# Patient Record
Sex: MALE | Race: WHITE | Employment: OTHER | URBAN - METROPOLITAN AREA
[De-identification: names, ages, dates, MRNs, and addresses within clinical notes are randomized per-mention and may not be internally consistent; named-entity substitution may affect disease eponyms.]

---

## 2017-01-01 ENCOUNTER — OFFICE VISIT (OUTPATIENT)
Dept: CARDIOLOGY CLINIC | Facility: HOSPITAL | Age: 82
End: 2017-01-01
Attending: INTERNAL MEDICINE
Payer: MEDICARE

## 2017-01-01 ENCOUNTER — HOSPITAL ENCOUNTER (INPATIENT)
Facility: HOSPITAL | Age: 82
LOS: 11 days | Discharge: HOME HEALTH | DRG: 286 | End: 2017-01-01
Attending: EMERGENCY MEDICINE | Admitting: HOSPITALIST
Payer: MEDICARE

## 2017-01-01 ENCOUNTER — APPOINTMENT (OUTPATIENT)
Dept: ULTRASOUND IMAGING | Facility: HOSPITAL | Age: 82
DRG: 291 | End: 2017-01-01
Attending: INTERNAL MEDICINE
Payer: MEDICARE

## 2017-01-01 ENCOUNTER — APPOINTMENT (OUTPATIENT)
Dept: CT IMAGING | Facility: HOSPITAL | Age: 82
DRG: 377 | End: 2017-01-01
Attending: EMERGENCY MEDICINE
Payer: MEDICARE

## 2017-01-01 ENCOUNTER — HOSPITAL ENCOUNTER (INPATIENT)
Facility: HOSPITAL | Age: 82
LOS: 1 days | DRG: 177 | End: 2017-01-01
Attending: INTERNAL MEDICINE | Admitting: INTERNAL MEDICINE
Payer: OTHER MISCELLANEOUS

## 2017-01-01 ENCOUNTER — TELEPHONE (OUTPATIENT)
Dept: CARDIOLOGY UNIT | Facility: HOSPITAL | Age: 82
End: 2017-01-01

## 2017-01-01 ENCOUNTER — APPOINTMENT (OUTPATIENT)
Dept: GENERAL RADIOLOGY | Facility: HOSPITAL | Age: 82
DRG: 291 | End: 2017-01-01
Attending: CLINICAL NURSE SPECIALIST
Payer: MEDICARE

## 2017-01-01 ENCOUNTER — LAB REQUISITION (OUTPATIENT)
Dept: LAB | Facility: HOSPITAL | Age: 82
End: 2017-01-01
Payer: MEDICARE

## 2017-01-01 ENCOUNTER — APPOINTMENT (OUTPATIENT)
Dept: CV DIAGNOSTICS | Facility: HOSPITAL | Age: 82
DRG: 286 | End: 2017-01-01
Attending: HOSPITALIST
Payer: MEDICARE

## 2017-01-01 ENCOUNTER — APPOINTMENT (OUTPATIENT)
Dept: ULTRASOUND IMAGING | Facility: HOSPITAL | Age: 82
DRG: 286 | End: 2017-01-01
Attending: INTERNAL MEDICINE
Payer: MEDICARE

## 2017-01-01 ENCOUNTER — ANESTHESIA EVENT (OUTPATIENT)
Dept: ENDOSCOPY | Facility: HOSPITAL | Age: 82
End: 2017-01-01

## 2017-01-01 ENCOUNTER — APPOINTMENT (OUTPATIENT)
Dept: GENERAL RADIOLOGY | Facility: HOSPITAL | Age: 82
DRG: 291 | End: 2017-01-01
Attending: INTERNAL MEDICINE
Payer: MEDICARE

## 2017-01-01 ENCOUNTER — TELEPHONE (OUTPATIENT)
Dept: CARDIOLOGY CLINIC | Facility: HOSPITAL | Age: 82
End: 2017-01-01

## 2017-01-01 ENCOUNTER — TELEPHONE (OUTPATIENT)
Dept: GASTROENTEROLOGY | Facility: CLINIC | Age: 82
End: 2017-01-01

## 2017-01-01 ENCOUNTER — APPOINTMENT (OUTPATIENT)
Dept: GENERAL RADIOLOGY | Facility: HOSPITAL | Age: 82
DRG: 377 | End: 2017-01-01
Attending: EMERGENCY MEDICINE
Payer: MEDICARE

## 2017-01-01 ENCOUNTER — APPOINTMENT (OUTPATIENT)
Dept: GENERAL RADIOLOGY | Facility: HOSPITAL | Age: 82
DRG: 286 | End: 2017-01-01
Attending: EMERGENCY MEDICINE
Payer: MEDICARE

## 2017-01-01 ENCOUNTER — APPOINTMENT (OUTPATIENT)
Dept: ULTRASOUND IMAGING | Facility: HOSPITAL | Age: 82
DRG: 286 | End: 2017-01-01
Attending: HOSPITALIST
Payer: MEDICARE

## 2017-01-01 ENCOUNTER — OFFICE VISIT (OUTPATIENT)
Dept: CARDIOLOGY CLINIC | Facility: HOSPITAL | Age: 82
DRG: 291 | End: 2017-01-01
Attending: INTERNAL MEDICINE
Payer: MEDICARE

## 2017-01-01 ENCOUNTER — ANESTHESIA (OUTPATIENT)
Dept: ENDOSCOPY | Facility: HOSPITAL | Age: 82
End: 2017-01-01

## 2017-01-01 ENCOUNTER — APPOINTMENT (OUTPATIENT)
Dept: GENERAL RADIOLOGY | Facility: HOSPITAL | Age: 82
DRG: 291 | End: 2017-01-01
Attending: EMERGENCY MEDICINE
Payer: MEDICARE

## 2017-01-01 ENCOUNTER — HOSPITAL ENCOUNTER (INPATIENT)
Facility: HOSPITAL | Age: 82
LOS: 1 days | Discharge: HOSPICE | DRG: 177 | End: 2017-01-01
Attending: EMERGENCY MEDICINE | Admitting: INTERNAL MEDICINE
Payer: MEDICARE

## 2017-01-01 ENCOUNTER — SURGERY (OUTPATIENT)
Age: 82
End: 2017-01-01

## 2017-01-01 ENCOUNTER — HOSPITAL ENCOUNTER (INPATIENT)
Facility: HOSPITAL | Age: 82
LOS: 5 days | Discharge: HOME HEALTH | DRG: 377 | End: 2017-01-01
Attending: EMERGENCY MEDICINE | Admitting: HOSPITALIST
Payer: MEDICARE

## 2017-01-01 ENCOUNTER — APPOINTMENT (OUTPATIENT)
Dept: INTERVENTIONAL RADIOLOGY/VASCULAR | Facility: HOSPITAL | Age: 82
DRG: 286 | End: 2017-01-01
Attending: NURSE PRACTITIONER
Payer: MEDICARE

## 2017-01-01 ENCOUNTER — APPOINTMENT (OUTPATIENT)
Dept: GENERAL RADIOLOGY | Facility: HOSPITAL | Age: 82
DRG: 177 | End: 2017-01-01
Attending: EMERGENCY MEDICINE
Payer: MEDICARE

## 2017-01-01 ENCOUNTER — LAB REQUISITION (OUTPATIENT)
Dept: LAB | Facility: HOSPITAL | Age: 82
End: 2017-01-01
Attending: INTERNAL MEDICINE
Payer: MEDICARE

## 2017-01-01 ENCOUNTER — HOSPITAL ENCOUNTER (INPATIENT)
Facility: HOSPITAL | Age: 82
LOS: 10 days | Discharge: HOME HEALTH | DRG: 291 | End: 2017-01-01
Attending: EMERGENCY MEDICINE | Admitting: EMERGENCY MEDICINE
Payer: MEDICARE

## 2017-01-01 ENCOUNTER — HOSPITAL ENCOUNTER (OUTPATIENT)
Dept: GENERAL RADIOLOGY | Facility: HOSPITAL | Age: 82
Discharge: HOME | End: 2017-01-01
Attending: INTERNAL MEDICINE
Payer: MEDICARE

## 2017-01-01 ENCOUNTER — TELEPHONE (OUTPATIENT)
Dept: MEDSURG UNIT | Facility: HOSPITAL | Age: 82
End: 2017-01-01

## 2017-01-01 ENCOUNTER — OFFICE VISIT (OUTPATIENT)
Dept: CARDIOLOGY CLINIC | Facility: HOSPITAL | Age: 82
DRG: 377 | End: 2017-01-01
Attending: INTERNAL MEDICINE
Payer: MEDICARE

## 2017-01-01 VITALS
DIASTOLIC BLOOD PRESSURE: 45 MMHG | HEART RATE: 63 BPM | WEIGHT: 123.38 LBS | RESPIRATION RATE: 18 BRPM | SYSTOLIC BLOOD PRESSURE: 91 MMHG | TEMPERATURE: 98 F | HEIGHT: 66 IN | BODY MASS INDEX: 19.83 KG/M2 | OXYGEN SATURATION: 95 %

## 2017-01-01 VITALS
BODY MASS INDEX: 25 KG/M2 | OXYGEN SATURATION: 100 % | HEART RATE: 60 BPM | WEIGHT: 157.13 LBS | RESPIRATION RATE: 16 BRPM | SYSTOLIC BLOOD PRESSURE: 93 MMHG | DIASTOLIC BLOOD PRESSURE: 51 MMHG

## 2017-01-01 VITALS
SYSTOLIC BLOOD PRESSURE: 115 MMHG | DIASTOLIC BLOOD PRESSURE: 53 MMHG | WEIGHT: 136.19 LBS | HEART RATE: 61 BPM | BODY MASS INDEX: 22 KG/M2 | RESPIRATION RATE: 16 BRPM | OXYGEN SATURATION: 97 %

## 2017-01-01 VITALS
RESPIRATION RATE: 21 BRPM | SYSTOLIC BLOOD PRESSURE: 129 MMHG | HEART RATE: 51 BPM | TEMPERATURE: 98 F | DIASTOLIC BLOOD PRESSURE: 49 MMHG | OXYGEN SATURATION: 93 %

## 2017-01-01 VITALS
OXYGEN SATURATION: 98 % | BODY MASS INDEX: 22 KG/M2 | HEART RATE: 62 BPM | SYSTOLIC BLOOD PRESSURE: 83 MMHG | WEIGHT: 134 LBS | DIASTOLIC BLOOD PRESSURE: 51 MMHG

## 2017-01-01 VITALS
RESPIRATION RATE: 20 BRPM | WEIGHT: 127.19 LBS | SYSTOLIC BLOOD PRESSURE: 129 MMHG | TEMPERATURE: 99 F | BODY MASS INDEX: 20.44 KG/M2 | OXYGEN SATURATION: 94 % | HEART RATE: 60 BPM | HEIGHT: 66 IN | DIASTOLIC BLOOD PRESSURE: 41 MMHG

## 2017-01-01 VITALS
HEART RATE: 60 BPM | BODY MASS INDEX: 23 KG/M2 | RESPIRATION RATE: 16 BRPM | DIASTOLIC BLOOD PRESSURE: 58 MMHG | OXYGEN SATURATION: 97 % | SYSTOLIC BLOOD PRESSURE: 119 MMHG | WEIGHT: 140.88 LBS

## 2017-01-01 VITALS
HEIGHT: 65.98 IN | SYSTOLIC BLOOD PRESSURE: 89 MMHG | OXYGEN SATURATION: 98 % | WEIGHT: 148.31 LBS | DIASTOLIC BLOOD PRESSURE: 49 MMHG | TEMPERATURE: 98 F | HEART RATE: 61 BPM | RESPIRATION RATE: 18 BRPM | BODY MASS INDEX: 23.83 KG/M2

## 2017-01-01 VITALS
HEIGHT: 66 IN | SYSTOLIC BLOOD PRESSURE: 126 MMHG | HEART RATE: 60 BPM | BODY MASS INDEX: 22.5 KG/M2 | WEIGHT: 140 LBS | TEMPERATURE: 98 F | RESPIRATION RATE: 21 BRPM | OXYGEN SATURATION: 98 % | DIASTOLIC BLOOD PRESSURE: 28 MMHG

## 2017-01-01 VITALS
WEIGHT: 140.69 LBS | BODY MASS INDEX: 23 KG/M2 | HEART RATE: 62 BPM | RESPIRATION RATE: 18 BRPM | SYSTOLIC BLOOD PRESSURE: 90 MMHG | OXYGEN SATURATION: 98 % | DIASTOLIC BLOOD PRESSURE: 45 MMHG

## 2017-01-01 VITALS
BODY MASS INDEX: 21 KG/M2 | SYSTOLIC BLOOD PRESSURE: 122 MMHG | RESPIRATION RATE: 16 BRPM | OXYGEN SATURATION: 92 % | DIASTOLIC BLOOD PRESSURE: 61 MMHG | HEART RATE: 60 BPM | WEIGHT: 131 LBS

## 2017-01-01 VITALS
HEART RATE: 61 BPM | DIASTOLIC BLOOD PRESSURE: 47 MMHG | OXYGEN SATURATION: 97 % | BODY MASS INDEX: 25 KG/M2 | SYSTOLIC BLOOD PRESSURE: 92 MMHG | WEIGHT: 153 LBS

## 2017-01-01 VITALS
DIASTOLIC BLOOD PRESSURE: 37 MMHG | BODY MASS INDEX: 24 KG/M2 | RESPIRATION RATE: 18 BRPM | SYSTOLIC BLOOD PRESSURE: 118 MMHG | WEIGHT: 148 LBS | OXYGEN SATURATION: 93 % | HEART RATE: 67 BPM

## 2017-01-01 VITALS
HEART RATE: 62 BPM | SYSTOLIC BLOOD PRESSURE: 105 MMHG | OXYGEN SATURATION: 90 % | BODY MASS INDEX: 21 KG/M2 | DIASTOLIC BLOOD PRESSURE: 42 MMHG | WEIGHT: 132 LBS

## 2017-01-01 DIAGNOSIS — D50.0 CHRONIC BLOOD LOSS ANEMIA: Primary | ICD-10-CM

## 2017-01-01 DIAGNOSIS — R19.5 OCCULT BLOOD POSITIVE STOOL: ICD-10-CM

## 2017-01-01 DIAGNOSIS — I50.9 ACUTE ON CHRONIC CONGESTIVE HEART FAILURE, UNSPECIFIED CONGESTIVE HEART FAILURE TYPE: ICD-10-CM

## 2017-01-01 DIAGNOSIS — N18.9 ACUTE RENAL FAILURE SUPERIMPOSED ON CHRONIC KIDNEY DISEASE, UNSPECIFIED CKD STAGE, UNSPECIFIED ACUTE RENAL FAILURE TYPE (HCC): ICD-10-CM

## 2017-01-01 DIAGNOSIS — D64.9 ANEMIA: ICD-10-CM

## 2017-01-01 DIAGNOSIS — R18.8 OTHER ASCITES: ICD-10-CM

## 2017-01-01 DIAGNOSIS — D64.9 ANEMIA, UNSPECIFIED TYPE: Primary | ICD-10-CM

## 2017-01-01 DIAGNOSIS — I50.9 HEART FAILURE, UNSPECIFIED (HCC): Primary | ICD-10-CM

## 2017-01-01 DIAGNOSIS — D64.9 ANEMIA, UNSPECIFIED TYPE: ICD-10-CM

## 2017-01-01 DIAGNOSIS — E11.9 TYPE 2 DIABETES MELLITUS WITHOUT COMPLICATIONS (HCC): ICD-10-CM
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
## 2017-01-01 DIAGNOSIS — R53.1 WEAKNESS GENERALIZED: ICD-10-CM

## 2017-01-01 DIAGNOSIS — R19.5 HEME POSITIVE STOOL: ICD-10-CM

## 2017-01-01 DIAGNOSIS — N17.9 ACUTE RENAL FAILURE SUPERIMPOSED ON CHRONIC KIDNEY DISEASE, UNSPECIFIED CKD STAGE, UNSPECIFIED ACUTE RENAL FAILURE TYPE (HCC): ICD-10-CM

## 2017-01-01 DIAGNOSIS — I25.5 ISCHEMIC CARDIOMYOPATHY: ICD-10-CM

## 2017-01-01 DIAGNOSIS — I50.23 ACUTE ON CHRONIC SYSTOLIC CONGESTIVE HEART FAILURE (HCC): ICD-10-CM

## 2017-01-01 DIAGNOSIS — E86.0 DEHYDRATION: ICD-10-CM

## 2017-01-01 DIAGNOSIS — I50.9 HEART FAILURE (HCC): ICD-10-CM

## 2017-01-01 DIAGNOSIS — N28.9 ACUTE RENAL INSUFFICIENCY: ICD-10-CM

## 2017-01-01 DIAGNOSIS — E83.52 HYPERCALCEMIA: ICD-10-CM

## 2017-01-01 DIAGNOSIS — R60.1 ANASARCA: Primary | ICD-10-CM

## 2017-01-01 DIAGNOSIS — I25.10 ATHEROSCLEROTIC HEART DISEASE OF NATIVE CORONARY ARTERY WITHOUT ANGINA PECTORIS: ICD-10-CM

## 2017-01-01 DIAGNOSIS — I50.9 ACUTE ON CHRONIC CONGESTIVE HEART FAILURE, UNSPECIFIED CONGESTIVE HEART FAILURE TYPE: Primary | ICD-10-CM

## 2017-01-01 DIAGNOSIS — J18.9 PNEUMONIA OF BOTH LUNGS DUE TO INFECTIOUS ORGANISM, UNSPECIFIED PART OF LUNG: ICD-10-CM

## 2017-01-01 LAB
ALBUMIN SERPL BCP-MCNC: 2.2 G/DL (ref 3.5–4.8)
ALBUMIN SERPL BCP-MCNC: 2.5 G/DL (ref 3.5–4.8)
ALBUMIN SERPL BCP-MCNC: 2.6 G/DL (ref 3.5–4.8)
ALBUMIN/GLOB SERPL: 0.5 {RATIO} (ref 1–2)
ALBUMIN/GLOB SERPL: 0.5 {RATIO} (ref 1–2)
ALP SERPL-CCNC: 66 U/L (ref 32–100)
ALP SERPL-CCNC: 67 U/L (ref 32–100)
ALP SERPL-CCNC: 67 U/L (ref 32–100)
ALT SERPL-CCNC: 14 U/L (ref 17–63)
ALT SERPL-CCNC: 15 U/L (ref 17–63)
ALT SERPL-CCNC: 18 U/L (ref 17–63)
ANION GAP SERPL CALC-SCNC: 10 MMOL/L (ref 0–18)
ANION GAP SERPL CALC-SCNC: 3 MMOL/L (ref 0–18)
ANION GAP SERPL CALC-SCNC: 3 MMOL/L (ref 0–18)
ANION GAP SERPL CALC-SCNC: 4 MMOL/L (ref 0–18)
ANION GAP SERPL CALC-SCNC: 5 MMOL/L (ref 0–18)
ANION GAP SERPL CALC-SCNC: 5 MMOL/L (ref 0–18)
ANION GAP SERPL CALC-SCNC: 6 MMOL/L (ref 0–18)
ANION GAP SERPL CALC-SCNC: 7 MMOL/L (ref 0–18)
ANION GAP SERPL CALC-SCNC: 8 MMOL/L (ref 0–18)
ANION GAP SERPL CALC-SCNC: 8 MMOL/L (ref 0–18)
ANION GAP SERPL CALC-SCNC: 9 MMOL/L (ref 0–18)
ANION GAP SERPL CALC-SCNC: 9 MMOL/L (ref 0–18)
ANTIBODY SCREEN: NEGATIVE
ANTIBODY SCREEN: NEGATIVE
APTT PPP: 28.8 SECONDS (ref 23.2–35.3)
AST SERPL-CCNC: 35 U/L (ref 15–41)
AST SERPL-CCNC: 36 U/L (ref 15–41)
AST SERPL-CCNC: 37 U/L (ref 15–41)
BACTERIA UR QL AUTO: NEGATIVE /HPF
BASOPHILS # BLD: 0 K/UL (ref 0–0.2)
BASOPHILS # BLD: 0.1 K/UL (ref 0–0.2)
BASOPHILS NFR BLD: 1 %
BILIRUB DIRECT SERPL-MCNC: 0.1 MG/DL (ref 0–0.2)
BILIRUB SERPL-MCNC: 0.7 MG/DL (ref 0.3–1.2)
BILIRUB SERPL-MCNC: 0.8 MG/DL (ref 0.3–1.2)
BILIRUB SERPL-MCNC: 2.4 MG/DL (ref 0.3–1.2)
BILIRUB UR QL: NEGATIVE
BLOOD TYPE BARCODE: 5100
BLOOD TYPE BARCODE: 9500
BNP SERPL-MCNC: 1165 PG/ML (ref 0–100)
BNP SERPL-MCNC: 1941 PG/ML (ref 0–100)
BUN SERPL-MCNC: 19 MG/DL (ref 8–20)
BUN SERPL-MCNC: 21 MG/DL (ref 8–20)
BUN SERPL-MCNC: 22 MG/DL (ref 8–20)
BUN SERPL-MCNC: 23 MG/DL (ref 8–20)
BUN SERPL-MCNC: 24 MG/DL (ref 8–20)
BUN SERPL-MCNC: 26 MG/DL (ref 8–20)
BUN SERPL-MCNC: 27 MG/DL (ref 8–20)
BUN SERPL-MCNC: 28 MG/DL (ref 8–20)
BUN SERPL-MCNC: 28 MG/DL (ref 8–20)
BUN SERPL-MCNC: 29 MG/DL (ref 8–20)
BUN SERPL-MCNC: 30 MG/DL (ref 8–20)
BUN SERPL-MCNC: 33 MG/DL (ref 8–20)
BUN SERPL-MCNC: 34 MG/DL (ref 8–20)
BUN SERPL-MCNC: 36 MG/DL (ref 8–20)
BUN SERPL-MCNC: 36 MG/DL (ref 8–20)
BUN SERPL-MCNC: 45 MG/DL (ref 8–20)
BUN SERPL-MCNC: 49 MG/DL (ref 8–20)
BUN SERPL-MCNC: 50 MG/DL (ref 8–20)
BUN SERPL-MCNC: 52 MG/DL (ref 8–20)
BUN SERPL-MCNC: 57 MG/DL (ref 8–20)
BUN SERPL-MCNC: 60 MG/DL (ref 8–20)
BUN/CREAT SERPL: 12.4 (ref 10–20)
BUN/CREAT SERPL: 13.1 (ref 10–20)
BUN/CREAT SERPL: 16.3 (ref 10–20)
BUN/CREAT SERPL: 16.5 (ref 10–20)
BUN/CREAT SERPL: 16.7 (ref 10–20)
BUN/CREAT SERPL: 16.8 (ref 10–20)
BUN/CREAT SERPL: 17 (ref 10–20)
BUN/CREAT SERPL: 17.9 (ref 10–20)
BUN/CREAT SERPL: 18.4 (ref 10–20)
BUN/CREAT SERPL: 20.2 (ref 10–20)
BUN/CREAT SERPL: 20.4 (ref 10–20)
BUN/CREAT SERPL: 20.8 (ref 10–20)
BUN/CREAT SERPL: 21 (ref 10–20)
BUN/CREAT SERPL: 21.1 (ref 10–20)
BUN/CREAT SERPL: 23.1 (ref 10–20)
BUN/CREAT SERPL: 24.1 (ref 10–20)
BUN/CREAT SERPL: 25.4 (ref 10–20)
BUN/CREAT SERPL: 30.5 (ref 10–20)
BUN/CREAT SERPL: 31 (ref 10–20)
BUN/CREAT SERPL: 31.1 (ref 10–20)
BUN/CREAT SERPL: 32.1 (ref 10–20)
BUN/CREAT SERPL: 32.7 (ref 10–20)
BUN/CREAT SERPL: 34.1 (ref 10–20)
CALCIUM SERPL-MCNC: 8 MG/DL (ref 8.5–10.5)
CALCIUM SERPL-MCNC: 8.1 MG/DL (ref 8.5–10.5)
CALCIUM SERPL-MCNC: 8.2 MG/DL (ref 8.5–10.5)
CALCIUM SERPL-MCNC: 8.4 MG/DL (ref 8.5–10.5)
CALCIUM SERPL-MCNC: 8.5 MG/DL (ref 8.5–10.5)
CALCIUM SERPL-MCNC: 8.5 MG/DL (ref 8.5–10.5)
CALCIUM SERPL-MCNC: 8.6 MG/DL (ref 8.5–10.5)
CALCIUM SERPL-MCNC: 8.7 MG/DL (ref 8.5–10.5)
CALCIUM SERPL-MCNC: 8.8 MG/DL (ref 8.5–10.5)
CALCIUM SERPL-MCNC: 8.8 MG/DL (ref 8.5–10.5)
CALCIUM SERPL-MCNC: 8.9 MG/DL (ref 8.5–10.5)
CALCIUM SERPL-MCNC: 8.9 MG/DL (ref 8.5–10.5)
CALCIUM SERPL-MCNC: 9 MG/DL (ref 8.5–10.5)
CALCIUM SERPL-MCNC: 9.4 MG/DL (ref 8.5–10.5)
CALCIUM SERPL-MCNC: 9.4 MG/DL (ref 8.5–10.5)
CHLORIDE SERPL-SCNC: 100 MMOL/L (ref 95–110)
CHLORIDE SERPL-SCNC: 101 MMOL/L (ref 95–110)
CHLORIDE SERPL-SCNC: 102 MMOL/L (ref 95–110)
CHLORIDE SERPL-SCNC: 84 MMOL/L (ref 95–110)
CHLORIDE SERPL-SCNC: 84 MMOL/L (ref 95–110)
CHLORIDE SERPL-SCNC: 85 MMOL/L (ref 95–110)
CHLORIDE SERPL-SCNC: 85 MMOL/L (ref 95–110)
CHLORIDE SERPL-SCNC: 88 MMOL/L (ref 95–110)
CHLORIDE SERPL-SCNC: 89 MMOL/L (ref 95–110)
CHLORIDE SERPL-SCNC: 91 MMOL/L (ref 95–110)
CHLORIDE SERPL-SCNC: 91 MMOL/L (ref 95–110)
CHLORIDE SERPL-SCNC: 92 MMOL/L (ref 95–110)
CHLORIDE SERPL-SCNC: 93 MMOL/L (ref 95–110)
CHLORIDE SERPL-SCNC: 97 MMOL/L (ref 95–110)
CHLORIDE SERPL-SCNC: 98 MMOL/L (ref 95–110)
CHLORIDE SERPL-SCNC: 99 MMOL/L (ref 95–110)
CHLORIDE SERPL-SCNC: 99 MMOL/L (ref 95–110)
CHOLEST SERPL-MCNC: 124 MG/DL (ref 110–200)
CLARITY UR: CLEAR
CO2 SERPL-SCNC: 30 MMOL/L (ref 22–32)
CO2 SERPL-SCNC: 31 MMOL/L (ref 22–32)
CO2 SERPL-SCNC: 32 MMOL/L (ref 22–32)
CO2 SERPL-SCNC: 32 MMOL/L (ref 22–32)
CO2 SERPL-SCNC: 33 MMOL/L (ref 22–32)
CO2 SERPL-SCNC: 34 MMOL/L (ref 22–32)
CO2 SERPL-SCNC: 36 MMOL/L (ref 22–32)
CO2 SERPL-SCNC: 37 MMOL/L (ref 22–32)
CO2 SERPL-SCNC: 37 MMOL/L (ref 22–32)
CO2 SERPL-SCNC: 38 MMOL/L (ref 22–32)
CO2 SERPL-SCNC: 38 MMOL/L (ref 22–32)
CO2 SERPL-SCNC: 39 MMOL/L (ref 22–32)
CO2 SERPL-SCNC: 39 MMOL/L (ref 22–32)
COLOR UR: YELLOW
CREAT SERPL-MCNC: 1.16 MG/DL (ref 0.5–1.5)
CREAT SERPL-MCNC: 1.23 MG/DL (ref 0.5–1.5)
CREAT SERPL-MCNC: 1.32 MG/DL (ref 0.5–1.5)
CREAT SERPL-MCNC: 1.33 MG/DL (ref 0.5–1.5)
CREAT SERPL-MCNC: 1.37 MG/DL (ref 0.5–1.5)
CREAT SERPL-MCNC: 1.37 MG/DL (ref 0.5–1.5)
CREAT SERPL-MCNC: 1.38 MG/DL (ref 0.5–1.5)
CREAT SERPL-MCNC: 1.41 MG/DL (ref 0.5–1.5)
CREAT SERPL-MCNC: 1.41 MG/DL (ref 0.5–1.5)
CREAT SERPL-MCNC: 1.43 MG/DL (ref 0.5–1.5)
CREAT SERPL-MCNC: 1.44 MG/DL (ref 0.5–1.5)
CREAT SERPL-MCNC: 1.47 MG/DL (ref 0.5–1.5)
CREAT SERPL-MCNC: 1.51 MG/DL (ref 0.5–1.5)
CREAT SERPL-MCNC: 1.53 MG/DL (ref 0.5–1.5)
CREAT SERPL-MCNC: 1.53 MG/DL (ref 0.5–1.5)
CREAT SERPL-MCNC: 1.56 MG/DL (ref 0.5–1.5)
CREAT SERPL-MCNC: 1.6 MG/DL (ref 0.5–1.5)
CREAT SERPL-MCNC: 1.62 MG/DL (ref 0.5–1.5)
CREAT SERPL-MCNC: 1.65 MG/DL (ref 0.5–1.5)
CREAT SERPL-MCNC: 1.78 MG/DL (ref 0.5–1.5)
CREAT SERPL-MCNC: 1.83 MG/DL (ref 0.5–1.5)
CREAT SERPL-MCNC: 1.93 MG/DL (ref 0.5–1.5)
CREAT SERPL-MCNC: 1.97 MG/DL (ref 0.5–1.5)
EOSINOPHIL # BLD: 0 K/UL (ref 0–0.7)
EOSINOPHIL # BLD: 0.1 K/UL (ref 0–0.7)
EOSINOPHIL # BLD: 0.2 K/UL (ref 0–0.7)
EOSINOPHIL NFR BLD: 0 %
EOSINOPHIL NFR BLD: 1 %
EOSINOPHIL NFR BLD: 2 %
EOSINOPHIL NFR BLD: 3 %
ERYTHROCYTE [DISTWIDTH] IN BLOOD BY AUTOMATED COUNT: 16.9 % (ref 11–15)
ERYTHROCYTE [DISTWIDTH] IN BLOOD BY AUTOMATED COUNT: 18.2 % (ref 11–15)
ERYTHROCYTE [DISTWIDTH] IN BLOOD BY AUTOMATED COUNT: 18.4 % (ref 11–15)
ERYTHROCYTE [DISTWIDTH] IN BLOOD BY AUTOMATED COUNT: 19.1 % (ref 11–15)
ERYTHROCYTE [DISTWIDTH] IN BLOOD BY AUTOMATED COUNT: 19.1 % (ref 11–15)
ERYTHROCYTE [DISTWIDTH] IN BLOOD BY AUTOMATED COUNT: 19.4 % (ref 11–15)
ERYTHROCYTE [DISTWIDTH] IN BLOOD BY AUTOMATED COUNT: 19.5 % (ref 11–15)
ERYTHROCYTE [DISTWIDTH] IN BLOOD BY AUTOMATED COUNT: 19.6 % (ref 11–15)
ERYTHROCYTE [DISTWIDTH] IN BLOOD BY AUTOMATED COUNT: 19.7 % (ref 11–15)
ERYTHROCYTE [DISTWIDTH] IN BLOOD BY AUTOMATED COUNT: 19.7 % (ref 11–15)
ERYTHROCYTE [DISTWIDTH] IN BLOOD BY AUTOMATED COUNT: 19.8 % (ref 11–15)
ERYTHROCYTE [DISTWIDTH] IN BLOOD BY AUTOMATED COUNT: 20.2 % (ref 11–15)
ERYTHROCYTE [DISTWIDTH] IN BLOOD BY AUTOMATED COUNT: 20.4 % (ref 11–15)
ERYTHROCYTE [DISTWIDTH] IN BLOOD BY AUTOMATED COUNT: 20.5 % (ref 11–15)
ERYTHROCYTE [DISTWIDTH] IN BLOOD BY AUTOMATED COUNT: 21.6 % (ref 11–15)
ERYTHROCYTE [DISTWIDTH] IN BLOOD BY AUTOMATED COUNT: 22.3 % (ref 11–15)
FERRITIN SERPL IA-MCNC: 95 NG/ML (ref 24–336)
GLOBULIN PLAS-MCNC: 4.5 G/DL (ref 2.5–3.7)
GLOBULIN PLAS-MCNC: 5.3 G/DL (ref 2.5–3.7)
GLUCOSE BLDC GLUCOMTR-MCNC: 101 MG/DL (ref 70–99)
GLUCOSE BLDC GLUCOMTR-MCNC: 101 MG/DL (ref 70–99)
GLUCOSE BLDC GLUCOMTR-MCNC: 102 MG/DL (ref 70–99)
GLUCOSE BLDC GLUCOMTR-MCNC: 104 MG/DL (ref 70–99)
GLUCOSE BLDC GLUCOMTR-MCNC: 106 MG/DL (ref 70–99)
GLUCOSE BLDC GLUCOMTR-MCNC: 107 MG/DL (ref 70–99)
GLUCOSE BLDC GLUCOMTR-MCNC: 107 MG/DL (ref 70–99)
GLUCOSE BLDC GLUCOMTR-MCNC: 108 MG/DL (ref 70–99)
GLUCOSE BLDC GLUCOMTR-MCNC: 117 MG/DL (ref 70–99)
GLUCOSE BLDC GLUCOMTR-MCNC: 118 MG/DL (ref 70–99)
GLUCOSE BLDC GLUCOMTR-MCNC: 118 MG/DL (ref 70–99)
GLUCOSE BLDC GLUCOMTR-MCNC: 121 MG/DL (ref 70–99)
GLUCOSE BLDC GLUCOMTR-MCNC: 121 MG/DL (ref 70–99)
GLUCOSE BLDC GLUCOMTR-MCNC: 122 MG/DL (ref 70–99)
GLUCOSE BLDC GLUCOMTR-MCNC: 122 MG/DL (ref 70–99)
GLUCOSE BLDC GLUCOMTR-MCNC: 123 MG/DL (ref 70–99)
GLUCOSE BLDC GLUCOMTR-MCNC: 124 MG/DL (ref 70–99)
GLUCOSE BLDC GLUCOMTR-MCNC: 124 MG/DL (ref 70–99)
GLUCOSE BLDC GLUCOMTR-MCNC: 126 MG/DL (ref 70–99)
GLUCOSE BLDC GLUCOMTR-MCNC: 127 MG/DL (ref 70–99)
GLUCOSE BLDC GLUCOMTR-MCNC: 130 MG/DL (ref 70–99)
GLUCOSE BLDC GLUCOMTR-MCNC: 131 MG/DL (ref 70–99)
GLUCOSE BLDC GLUCOMTR-MCNC: 131 MG/DL (ref 70–99)
GLUCOSE BLDC GLUCOMTR-MCNC: 138 MG/DL (ref 70–99)
GLUCOSE BLDC GLUCOMTR-MCNC: 141 MG/DL (ref 70–99)
GLUCOSE BLDC GLUCOMTR-MCNC: 149 MG/DL (ref 70–99)
GLUCOSE BLDC GLUCOMTR-MCNC: 154 MG/DL (ref 70–99)
GLUCOSE BLDC GLUCOMTR-MCNC: 159 MG/DL (ref 70–99)
GLUCOSE BLDC GLUCOMTR-MCNC: 171 MG/DL (ref 70–99)
GLUCOSE BLDC GLUCOMTR-MCNC: 173 MG/DL (ref 70–99)
GLUCOSE BLDC GLUCOMTR-MCNC: 174 MG/DL (ref 70–99)
GLUCOSE BLDC GLUCOMTR-MCNC: 198 MG/DL (ref 70–99)
GLUCOSE BLDC GLUCOMTR-MCNC: 211 MG/DL (ref 70–99)
GLUCOSE BLDC GLUCOMTR-MCNC: 76 MG/DL (ref 70–99)
GLUCOSE BLDC GLUCOMTR-MCNC: 83 MG/DL (ref 70–99)
GLUCOSE BLDC GLUCOMTR-MCNC: 85 MG/DL (ref 70–99)
GLUCOSE BLDC GLUCOMTR-MCNC: 87 MG/DL (ref 70–99)
GLUCOSE BLDC GLUCOMTR-MCNC: 87 MG/DL (ref 70–99)
GLUCOSE BLDC GLUCOMTR-MCNC: 93 MG/DL (ref 70–99)
GLUCOSE BLDC GLUCOMTR-MCNC: 94 MG/DL (ref 70–99)
GLUCOSE BLDC GLUCOMTR-MCNC: 95 MG/DL (ref 70–99)
GLUCOSE BLDC GLUCOMTR-MCNC: 96 MG/DL (ref 70–99)
GLUCOSE BLDC GLUCOMTR-MCNC: 96 MG/DL (ref 70–99)
GLUCOSE BLDC GLUCOMTR-MCNC: 97 MG/DL (ref 70–99)
GLUCOSE BLDC GLUCOMTR-MCNC: 98 MG/DL (ref 70–99)
GLUCOSE BLDC GLUCOMTR-MCNC: 99 MG/DL (ref 70–99)
GLUCOSE SERPL-MCNC: 101 MG/DL (ref 70–99)
GLUCOSE SERPL-MCNC: 102 MG/DL (ref 70–99)
GLUCOSE SERPL-MCNC: 103 MG/DL (ref 70–99)
GLUCOSE SERPL-MCNC: 106 MG/DL (ref 70–99)
GLUCOSE SERPL-MCNC: 106 MG/DL (ref 70–99)
GLUCOSE SERPL-MCNC: 110 MG/DL (ref 70–99)
GLUCOSE SERPL-MCNC: 114 MG/DL (ref 70–99)
GLUCOSE SERPL-MCNC: 130 MG/DL (ref 70–99)
GLUCOSE SERPL-MCNC: 132 MG/DL (ref 70–99)
GLUCOSE SERPL-MCNC: 138 MG/DL (ref 70–99)
GLUCOSE SERPL-MCNC: 193 MG/DL (ref 70–99)
GLUCOSE SERPL-MCNC: 196 MG/DL (ref 70–99)
GLUCOSE SERPL-MCNC: 83 MG/DL (ref 70–99)
GLUCOSE SERPL-MCNC: 88 MG/DL (ref 70–99)
GLUCOSE SERPL-MCNC: 89 MG/DL (ref 70–99)
GLUCOSE SERPL-MCNC: 90 MG/DL (ref 70–99)
GLUCOSE SERPL-MCNC: 90 MG/DL (ref 70–99)
GLUCOSE SERPL-MCNC: 91 MG/DL (ref 70–99)
GLUCOSE SERPL-MCNC: 96 MG/DL (ref 70–99)
GLUCOSE SERPL-MCNC: 96 MG/DL (ref 70–99)
GLUCOSE SERPL-MCNC: 97 MG/DL (ref 70–99)
GLUCOSE SERPL-MCNC: 97 MG/DL (ref 70–99)
GLUCOSE SERPL-MCNC: 98 MG/DL (ref 70–99)
GLUCOSE UR-MCNC: NEGATIVE MG/DL
HAV AB SER QL IA: REACTIVE
HAV IGM SERPL QL IA: NONREACTIVE
HBA1C MFR BLD: 5.3 % (ref 4–6)
HBV CORE AB SERPL QL IA: NONREACTIVE
HBV SURFACE AB SER-ACNC: 18.33 MIU/ML (ref ?–10)
HBV SURFACE AG SERPL QL IA: NONREACTIVE
HBV SURFACE AG SERPL QL IA: REACTIVE
HCT VFR BLD AUTO: 17.6 % (ref 41–52)
HCT VFR BLD AUTO: 18.8 % (ref 41–52)
HCT VFR BLD AUTO: 22.7 % (ref 41–52)
HCT VFR BLD AUTO: 22.7 % (ref 41–52)
HCT VFR BLD AUTO: 22.9 % (ref 41–52)
HCT VFR BLD AUTO: 23 % (ref 41–52)
HCT VFR BLD AUTO: 23.2 % (ref 41–52)
HCT VFR BLD AUTO: 23.8 % (ref 41–52)
HCT VFR BLD AUTO: 28.6 % (ref 41–52)
HCT VFR BLD AUTO: 28.6 % (ref 41–52)
HCT VFR BLD AUTO: 29.8 % (ref 41–52)
HCT VFR BLD AUTO: 30.6 % (ref 41–52)
HCT VFR BLD AUTO: 30.7 % (ref 41–52)
HCT VFR BLD AUTO: 31.1 % (ref 41–52)
HCT VFR BLD AUTO: 32 % (ref 41–52)
HCT VFR BLD AUTO: 32.2 % (ref 41–52)
HCT VFR BLD AUTO: 32.6 % (ref 41–52)
HCT VFR BLD AUTO: 33 % (ref 41–52)
HCV AB SERPL QL IA: NONREACTIVE
HDLC SERPL-MCNC: 29 MG/DL
HGB BLD-MCNC: 10 G/DL (ref 13.5–17.5)
HGB BLD-MCNC: 10.1 G/DL (ref 13.5–17.5)
HGB BLD-MCNC: 10.3 G/DL (ref 13.5–17.5)
HGB BLD-MCNC: 10.7 G/DL (ref 13.5–17.5)
HGB BLD-MCNC: 10.8 G/DL (ref 13.5–17.5)
HGB BLD-MCNC: 11 G/DL (ref 13.5–17.5)
HGB BLD-MCNC: 11.1 G/DL (ref 13.5–17.5)
HGB BLD-MCNC: 5.7 G/DL (ref 13.5–17.5)
HGB BLD-MCNC: 6.1 G/DL (ref 13.5–17.5)
HGB BLD-MCNC: 7.4 G/DL (ref 13.5–17.5)
HGB BLD-MCNC: 7.4 G/DL (ref 13.5–17.5)
HGB BLD-MCNC: 7.5 G/DL (ref 13.5–17.5)
HGB BLD-MCNC: 7.7 G/DL (ref 13.5–17.5)
HGB BLD-MCNC: 7.9 G/DL (ref 13.5–17.5)
HGB BLD-MCNC: 7.9 G/DL (ref 13.5–17.5)
HGB BLD-MCNC: 9.5 G/DL (ref 13.5–17.5)
HGB BLD-MCNC: 9.6 G/DL (ref 13.5–17.5)
HGB BLD-MCNC: 9.9 G/DL (ref 13.5–17.5)
HGB UR QL STRIP.AUTO: NEGATIVE
HYALINE CASTS #/AREA URNS AUTO: 4 /LPF
INR BLD: 1.3 (ref 0.9–1.2)
INR BLD: 1.5 (ref 0.9–1.2)
IRON SATN MFR SERPL: 18 % (ref 20–55)
IRON SERPL-MCNC: 42 MCG/DL (ref 45–182)
KETONES UR-MCNC: NEGATIVE MG/DL
LDLC SERPL CALC-MCNC: 83 MG/DL (ref 0–99)
LIPASE SERPL-CCNC: 29 U/L (ref 22–51)
LYMPHOCYTES # BLD: 0.5 K/UL (ref 1–4)
LYMPHOCYTES # BLD: 0.6 K/UL (ref 1–4)
LYMPHOCYTES # BLD: 0.7 K/UL (ref 1–4)
LYMPHOCYTES # BLD: 0.8 K/UL (ref 1–4)
LYMPHOCYTES # BLD: 0.8 K/UL (ref 1–4)
LYMPHOCYTES # BLD: 0.9 K/UL (ref 1–4)
LYMPHOCYTES # BLD: 0.9 K/UL (ref 1–4)
LYMPHOCYTES # BLD: 1 K/UL (ref 1–4)
LYMPHOCYTES # BLD: 1.1 K/UL (ref 1–4)
LYMPHOCYTES # BLD: 1.2 K/UL (ref 1–4)
LYMPHOCYTES # BLD: 1.2 K/UL (ref 1–4)
LYMPHOCYTES # BLD: 1.3 K/UL (ref 1–4)
LYMPHOCYTES NFR BLD: 10 %
LYMPHOCYTES NFR BLD: 11 %
LYMPHOCYTES NFR BLD: 12 %
LYMPHOCYTES NFR BLD: 13 %
LYMPHOCYTES NFR BLD: 14 %
LYMPHOCYTES NFR BLD: 14 %
LYMPHOCYTES NFR BLD: 15 %
LYMPHOCYTES NFR BLD: 15 %
LYMPHOCYTES NFR BLD: 16 %
LYMPHOCYTES NFR BLD: 5 %
MAGNESIUM SERPL-MCNC: 1.8 MG/DL (ref 1.8–2.5)
MAGNESIUM SERPL-MCNC: 1.9 MG/DL (ref 1.8–2.5)
MAGNESIUM SERPL-MCNC: 2 MG/DL (ref 1.8–2.5)
MAGNESIUM SERPL-MCNC: 2 MG/DL (ref 1.8–2.5)
MAGNESIUM SERPL-MCNC: 2.1 MG/DL (ref 1.8–2.5)
MAGNESIUM SERPL-MCNC: 2.1 MG/DL (ref 1.8–2.5)
MAGNESIUM SERPL-MCNC: 2.3 MG/DL (ref 1.8–2.5)
MAGNESIUM SERPL-MCNC: 2.3 MG/DL (ref 1.8–2.5)
MAGNESIUM SERPL-MCNC: 2.4 MG/DL (ref 1.8–2.5)
MAGNESIUM SERPL-MCNC: 2.5 MG/DL (ref 1.8–2.5)
MCH RBC QN AUTO: 33.6 PG (ref 27–32)
MCH RBC QN AUTO: 33.9 PG (ref 27–32)
MCH RBC QN AUTO: 34 PG (ref 27–32)
MCH RBC QN AUTO: 34.1 PG (ref 27–32)
MCH RBC QN AUTO: 34.3 PG (ref 27–32)
MCH RBC QN AUTO: 34.4 PG (ref 27–32)
MCH RBC QN AUTO: 34.5 PG (ref 27–32)
MCH RBC QN AUTO: 34.5 PG (ref 27–32)
MCH RBC QN AUTO: 34.7 PG (ref 27–32)
MCH RBC QN AUTO: 35.2 PG (ref 27–32)
MCH RBC QN AUTO: 35.3 PG (ref 27–32)
MCH RBC QN AUTO: 35.5 PG (ref 27–32)
MCHC RBC AUTO-ENTMCNC: 32.1 G/DL (ref 32–37)
MCHC RBC AUTO-ENTMCNC: 32.4 G/DL (ref 32–37)
MCHC RBC AUTO-ENTMCNC: 32.5 G/DL (ref 32–37)
MCHC RBC AUTO-ENTMCNC: 32.6 G/DL (ref 32–37)
MCHC RBC AUTO-ENTMCNC: 32.7 G/DL (ref 32–37)
MCHC RBC AUTO-ENTMCNC: 32.9 G/DL (ref 32–37)
MCHC RBC AUTO-ENTMCNC: 33 G/DL (ref 32–37)
MCHC RBC AUTO-ENTMCNC: 33 G/DL (ref 32–37)
MCHC RBC AUTO-ENTMCNC: 33.1 G/DL (ref 32–37)
MCHC RBC AUTO-ENTMCNC: 33.2 G/DL (ref 32–37)
MCHC RBC AUTO-ENTMCNC: 33.4 G/DL (ref 32–37)
MCHC RBC AUTO-ENTMCNC: 33.4 G/DL (ref 32–37)
MCHC RBC AUTO-ENTMCNC: 33.6 G/DL (ref 32–37)
MCHC RBC AUTO-ENTMCNC: 33.7 G/DL (ref 32–37)
MCHC RBC AUTO-ENTMCNC: 33.7 G/DL (ref 32–37)
MCHC RBC AUTO-ENTMCNC: 34.3 G/DL (ref 32–37)
MCV RBC AUTO: 100.5 FL (ref 80–100)
MCV RBC AUTO: 101.7 FL (ref 80–100)
MCV RBC AUTO: 101.9 FL (ref 80–100)
MCV RBC AUTO: 102 FL (ref 80–100)
MCV RBC AUTO: 102.1 FL (ref 80–100)
MCV RBC AUTO: 102.2 FL (ref 80–100)
MCV RBC AUTO: 102.4 FL (ref 80–100)
MCV RBC AUTO: 102.8 FL (ref 80–100)
MCV RBC AUTO: 103.1 FL (ref 80–100)
MCV RBC AUTO: 103.4 FL (ref 80–100)
MCV RBC AUTO: 103.9 FL (ref 80–100)
MCV RBC AUTO: 104 FL (ref 80–100)
MCV RBC AUTO: 104.4 FL (ref 80–100)
MCV RBC AUTO: 108.6 FL (ref 80–100)
MCV RBC AUTO: 108.6 FL (ref 80–100)
MCV RBC AUTO: 110.7 FL (ref 80–100)
MONOCYTES # BLD: 0.7 K/UL (ref 0–1)
MONOCYTES # BLD: 0.9 K/UL (ref 0–1)
MONOCYTES # BLD: 0.9 K/UL (ref 0–1)
MONOCYTES # BLD: 1 K/UL (ref 0–1)
MONOCYTES # BLD: 1.1 K/UL (ref 0–1)
MONOCYTES # BLD: 1.2 K/UL (ref 0–1)
MONOCYTES # BLD: 1.3 K/UL (ref 0–1)
MONOCYTES # BLD: 1.4 K/UL (ref 0–1)
MONOCYTES # BLD: 1.4 K/UL (ref 0–1)
MONOCYTES # BLD: 1.5 K/UL (ref 0–1)
MONOCYTES NFR BLD: 11 %
MONOCYTES NFR BLD: 12 %
MONOCYTES NFR BLD: 12 %
MONOCYTES NFR BLD: 13 %
MONOCYTES NFR BLD: 13 %
MONOCYTES NFR BLD: 14 %
MONOCYTES NFR BLD: 15 %
MONOCYTES NFR BLD: 16 %
MONOCYTES NFR BLD: 19 %
NEUTROPHILS # BLD AUTO: 3.9 K/UL (ref 1.8–7.7)
NEUTROPHILS # BLD AUTO: 4.2 K/UL (ref 1.8–7.7)
NEUTROPHILS # BLD AUTO: 4.3 K/UL (ref 1.8–7.7)
NEUTROPHILS # BLD AUTO: 4.4 K/UL (ref 1.8–7.7)
NEUTROPHILS # BLD AUTO: 4.4 K/UL (ref 1.8–7.7)
NEUTROPHILS # BLD AUTO: 4.6 K/UL (ref 1.8–7.7)
NEUTROPHILS # BLD AUTO: 4.8 K/UL (ref 1.8–7.7)
NEUTROPHILS # BLD AUTO: 4.8 K/UL (ref 1.8–7.7)
NEUTROPHILS # BLD AUTO: 5.1 K/UL (ref 1.8–7.7)
NEUTROPHILS # BLD AUTO: 5.6 K/UL (ref 1.8–7.7)
NEUTROPHILS # BLD AUTO: 5.8 K/UL (ref 1.8–7.7)
NEUTROPHILS # BLD AUTO: 5.9 K/UL (ref 1.8–7.7)
NEUTROPHILS # BLD AUTO: 6.2 K/UL (ref 1.8–7.7)
NEUTROPHILS # BLD AUTO: 6.5 K/UL (ref 1.8–7.7)
NEUTROPHILS # BLD AUTO: 6.6 K/UL (ref 1.8–7.7)
NEUTROPHILS # BLD AUTO: 6.8 K/UL (ref 1.8–7.7)
NEUTROPHILS # BLD AUTO: 6.8 K/UL (ref 1.8–7.7)
NEUTROPHILS # BLD AUTO: 9.2 K/UL (ref 1.8–7.7)
NEUTROPHILS NFR BLD: 61 %
NEUTROPHILS NFR BLD: 65 %
NEUTROPHILS NFR BLD: 66 %
NEUTROPHILS NFR BLD: 69 %
NEUTROPHILS NFR BLD: 70 %
NEUTROPHILS NFR BLD: 71 %
NEUTROPHILS NFR BLD: 71 %
NEUTROPHILS NFR BLD: 73 %
NEUTROPHILS NFR BLD: 73 %
NEUTROPHILS NFR BLD: 75 %
NEUTROPHILS NFR BLD: 77 %
NEUTROPHILS NFR BLD: 83 %
NITRITE UR QL STRIP.AUTO: NEGATIVE
NONHDLC SERPL-MCNC: 95 MG/DL
OSMOLALITY UR CALC.SUM OF ELEC: 275 MOSM/KG (ref 275–295)
OSMOLALITY UR CALC.SUM OF ELEC: 280 MOSM/KG (ref 275–295)
OSMOLALITY UR CALC.SUM OF ELEC: 282 MOSM/KG (ref 275–295)
OSMOLALITY UR CALC.SUM OF ELEC: 285 MOSM/KG (ref 275–295)
OSMOLALITY UR CALC.SUM OF ELEC: 286 MOSM/KG (ref 275–295)
OSMOLALITY UR CALC.SUM OF ELEC: 286 MOSM/KG (ref 275–295)
OSMOLALITY UR CALC.SUM OF ELEC: 289 MOSM/KG (ref 275–295)
OSMOLALITY UR CALC.SUM OF ELEC: 292 MOSM/KG (ref 275–295)
OSMOLALITY UR CALC.SUM OF ELEC: 293 MOSM/KG (ref 275–295)
OSMOLALITY UR CALC.SUM OF ELEC: 294 MOSM/KG (ref 275–295)
OSMOLALITY UR CALC.SUM OF ELEC: 294 MOSM/KG (ref 275–295)
OSMOLALITY UR CALC.SUM OF ELEC: 295 MOSM/KG (ref 275–295)
OSMOLALITY UR CALC.SUM OF ELEC: 296 MOSM/KG (ref 275–295)
OSMOLALITY UR CALC.SUM OF ELEC: 297 MOSM/KG (ref 275–295)
OSMOLALITY UR CALC.SUM OF ELEC: 300 MOSM/KG (ref 275–295)
PH UR: 6 [PH] (ref 5–8)
PLATELET # BLD AUTO: 173 K/UL (ref 140–400)
PLATELET # BLD AUTO: 183 K/UL (ref 140–400)
PLATELET # BLD AUTO: 184 K/UL (ref 140–400)
PLATELET # BLD AUTO: 204 K/UL (ref 140–400)
PLATELET # BLD AUTO: 204 K/UL (ref 140–400)
PLATELET # BLD AUTO: 205 K/UL (ref 140–400)
PLATELET # BLD AUTO: 207 K/UL (ref 140–400)
PLATELET # BLD AUTO: 211 K/UL (ref 140–400)
PLATELET # BLD AUTO: 214 K/UL (ref 140–400)
PLATELET # BLD AUTO: 222 K/UL (ref 140–400)
PLATELET # BLD AUTO: 223 K/UL (ref 140–400)
PLATELET # BLD AUTO: 224 K/UL (ref 140–400)
PLATELET # BLD AUTO: 231 K/UL (ref 140–400)
PLATELET # BLD AUTO: 244 K/UL (ref 140–400)
PLATELET # BLD AUTO: 255 K/UL (ref 140–400)
PLATELET # BLD AUTO: 267 K/UL (ref 140–400)
PLATELET # BLD AUTO: 285 K/UL (ref 140–400)
PLATELET # BLD AUTO: 305 K/UL (ref 140–400)
PMV BLD AUTO: 6.7 FL (ref 7.4–10.3)
PMV BLD AUTO: 6.7 FL (ref 7.4–10.3)
PMV BLD AUTO: 6.8 FL (ref 7.4–10.3)
PMV BLD AUTO: 6.9 FL (ref 7.4–10.3)
PMV BLD AUTO: 7 FL (ref 7.4–10.3)
PMV BLD AUTO: 7 FL (ref 7.4–10.3)
PMV BLD AUTO: 7.1 FL (ref 7.4–10.3)
PMV BLD AUTO: 7.2 FL (ref 7.4–10.3)
PMV BLD AUTO: 7.4 FL (ref 7.4–10.3)
PMV BLD AUTO: 7.6 FL (ref 7.4–10.3)
PMV BLD AUTO: 7.6 FL (ref 7.4–10.3)
PMV BLD AUTO: 7.7 FL (ref 7.4–10.3)
PMV BLD AUTO: 7.8 FL (ref 7.4–10.3)
PMV BLD AUTO: 7.9 FL (ref 7.4–10.3)
PMV BLD AUTO: 8.1 FL (ref 7.4–10.3)
PMV BLD AUTO: 8.1 FL (ref 7.4–10.3)
POTASSIUM SERPL-SCNC: 3.2 MMOL/L (ref 3.3–5.1)
POTASSIUM SERPL-SCNC: 3.3 MMOL/L (ref 3.3–5.1)
POTASSIUM SERPL-SCNC: 3.3 MMOL/L (ref 3.3–5.1)
POTASSIUM SERPL-SCNC: 3.5 MMOL/L (ref 3.3–5.1)
POTASSIUM SERPL-SCNC: 3.6 MMOL/L (ref 3.3–5.1)
POTASSIUM SERPL-SCNC: 3.8 MMOL/L (ref 3.3–5.1)
POTASSIUM SERPL-SCNC: 4 MMOL/L (ref 3.3–5.1)
POTASSIUM SERPL-SCNC: 4 MMOL/L (ref 3.3–5.1)
POTASSIUM SERPL-SCNC: 4.1 MMOL/L (ref 3.3–5.1)
POTASSIUM SERPL-SCNC: 4.2 MMOL/L (ref 3.3–5.1)
POTASSIUM SERPL-SCNC: 4.2 MMOL/L (ref 3.3–5.1)
POTASSIUM SERPL-SCNC: 4.3 MMOL/L (ref 3.3–5.1)
POTASSIUM SERPL-SCNC: 4.3 MMOL/L (ref 3.3–5.1)
POTASSIUM SERPL-SCNC: 4.5 MMOL/L (ref 3.3–5.1)
POTASSIUM SERPL-SCNC: 4.5 MMOL/L (ref 3.3–5.1)
POTASSIUM SERPL-SCNC: 4.6 MMOL/L (ref 3.3–5.1)
POTASSIUM SERPL-SCNC: 4.7 MMOL/L (ref 3.3–5.1)
POTASSIUM SERPL-SCNC: 4.7 MMOL/L (ref 3.3–5.1)
POTASSIUM SERPL-SCNC: 4.8 MMOL/L (ref 3.3–5.1)
POTASSIUM SERPL-SCNC: 4.9 MMOL/L (ref 3.3–5.1)
POTASSIUM SERPL-SCNC: 5 MMOL/L (ref 3.3–5.1)
POTASSIUM SERPL-SCNC: 5.1 MMOL/L (ref 3.3–5.1)
POTASSIUM SERPL-SCNC: 5.5 MMOL/L (ref 3.3–5.1)
POTASSIUM SERPL-SCNC: 5.5 MMOL/L (ref 3.3–5.1)
PROT SERPL-MCNC: 6.7 G/DL (ref 5.9–8.4)
PROT SERPL-MCNC: 7.6 G/DL (ref 5.9–8.4)
PROT SERPL-MCNC: 7.9 G/DL (ref 5.9–8.4)
PROT UR-MCNC: 30 MG/DL
PROTHROMBIN TIME: 15.9 SECONDS (ref 11.8–14.5)
PROTHROMBIN TIME: 17.6 SECONDS (ref 11.8–14.5)
RBC # BLD AUTO: 1.62 M/UL (ref 4.5–5.9)
RBC # BLD AUTO: 1.73 M/UL (ref 4.5–5.9)
RBC # BLD AUTO: 2.08 M/UL (ref 4.5–5.9)
RBC # BLD AUTO: 2.18 M/UL (ref 4.5–5.9)
RBC # BLD AUTO: 2.19 M/UL (ref 4.5–5.9)
RBC # BLD AUTO: 2.23 M/UL (ref 4.5–5.9)
RBC # BLD AUTO: 2.28 M/UL (ref 4.5–5.9)
RBC # BLD AUTO: 2.32 M/UL (ref 4.5–5.9)
RBC # BLD AUTO: 2.8 M/UL (ref 4.5–5.9)
RBC # BLD AUTO: 2.81 M/UL (ref 4.5–5.9)
RBC # BLD AUTO: 2.91 M/UL (ref 4.5–5.9)
RBC # BLD AUTO: 2.94 M/UL (ref 4.5–5.9)
RBC # BLD AUTO: 2.98 M/UL (ref 4.5–5.9)
RBC # BLD AUTO: 3.01 M/UL (ref 4.5–5.9)
RBC # BLD AUTO: 3.09 M/UL (ref 4.5–5.9)
RBC # BLD AUTO: 3.13 M/UL (ref 4.5–5.9)
RBC # BLD AUTO: 3.2 M/UL (ref 4.5–5.9)
RBC # BLD AUTO: 3.23 M/UL (ref 4.5–5.9)
RBC #/AREA URNS AUTO: 1 /HPF
RH BLOOD TYPE: POSITIVE
RH BLOOD TYPE: POSITIVE
SODIUM SERPL-SCNC: 130 MMOL/L (ref 136–144)
SODIUM SERPL-SCNC: 131 MMOL/L (ref 136–144)
SODIUM SERPL-SCNC: 131 MMOL/L (ref 136–144)
SODIUM SERPL-SCNC: 133 MMOL/L (ref 136–144)
SODIUM SERPL-SCNC: 133 MMOL/L (ref 136–144)
SODIUM SERPL-SCNC: 134 MMOL/L (ref 136–144)
SODIUM SERPL-SCNC: 136 MMOL/L (ref 136–144)
SODIUM SERPL-SCNC: 137 MMOL/L (ref 136–144)
SODIUM SERPL-SCNC: 138 MMOL/L (ref 136–144)
SODIUM SERPL-SCNC: 138 MMOL/L (ref 136–144)
SODIUM SERPL-SCNC: 139 MMOL/L (ref 136–144)
SODIUM SERPL-SCNC: 140 MMOL/L (ref 136–144)
SODIUM SERPL-SCNC: 140 MMOL/L (ref 136–144)
SP GR UR STRIP: 1.01 (ref 1–1.03)
T4 FREE SERPL-MCNC: 1.69 NG/DL (ref 0.58–1.64)
TIBC SERPL-MCNC: 228 MCG/DL (ref 228–428)
TRANSFERRIN SERPL-MCNC: 173 MG/DL (ref 180–329)
TRIGL SERPL-MCNC: 61 MG/DL (ref 1–149)
TROPONIN I SERPL-MCNC: 0.04 NG/ML (ref ?–0.03)
TROPONIN I SERPL-MCNC: 0.07 NG/ML (ref ?–0.03)
TROPONIN I SERPL-MCNC: 0.17 NG/ML (ref ?–0.03)
TROPONIN I SERPL-MCNC: 0.32 NG/ML (ref ?–0.03)
TSH SERPL-ACNC: 5.71 UIU/ML (ref 0.34–5.6)
UROBILINOGEN UR STRIP-ACNC: <2
VIT C UR-MCNC: NEGATIVE MG/DL
WBC # BLD AUTO: 11.1 K/UL (ref 4–11)
WBC # BLD AUTO: 5.8 K/UL (ref 4–11)
WBC # BLD AUTO: 5.9 K/UL (ref 4–11)
WBC # BLD AUTO: 6.7 K/UL (ref 4–11)
WBC # BLD AUTO: 6.7 K/UL (ref 4–11)
WBC # BLD AUTO: 6.8 K/UL (ref 4–11)
WBC # BLD AUTO: 6.9 K/UL (ref 4–11)
WBC # BLD AUTO: 6.9 K/UL (ref 4–11)
WBC # BLD AUTO: 7 K/UL (ref 4–11)
WBC # BLD AUTO: 7.1 K/UL (ref 4–11)
WBC # BLD AUTO: 8.1 K/UL (ref 4–11)
WBC # BLD AUTO: 8.1 K/UL (ref 4–11)
WBC # BLD AUTO: 8.3 K/UL (ref 4–11)
WBC # BLD AUTO: 8.4 K/UL (ref 4–11)
WBC # BLD AUTO: 9.3 K/UL (ref 4–11)
WBC # BLD AUTO: 9.4 K/UL (ref 4–11)
WBC # BLD AUTO: 9.4 K/UL (ref 4–11)
WBC # BLD AUTO: 9.5 K/UL (ref 4–11)
WBC #/AREA URNS AUTO: 3 /HPF

## 2017-01-01 PROCEDURE — 99222 1ST HOSP IP/OBS MODERATE 55: CPT | Performed by: INTERNAL MEDICINE

## 2017-01-01 PROCEDURE — 83735 ASSAY OF MAGNESIUM: CPT | Performed by: INTERNAL MEDICINE

## 2017-01-01 PROCEDURE — 99232 SBSQ HOSP IP/OBS MODERATE 35: CPT | Performed by: HOSPITALIST

## 2017-01-01 PROCEDURE — 36415 COLL VENOUS BLD VENIPUNCTURE: CPT | Performed by: CLINICAL NURSE SPECIALIST

## 2017-01-01 PROCEDURE — 99291 CRITICAL CARE FIRST HOUR: CPT | Performed by: INTERNAL MEDICINE

## 2017-01-01 PROCEDURE — 85025 COMPLETE CBC W/AUTO DIFF WBC: CPT | Performed by: CLINICAL NURSE SPECIALIST

## 2017-01-01 PROCEDURE — 99233 SBSQ HOSP IP/OBS HIGH 50: CPT | Performed by: HOSPITALIST

## 2017-01-01 PROCEDURE — 96374 THER/PROPH/DIAG INJ IV PUSH: CPT | Performed by: CLINICAL NURSE SPECIALIST

## 2017-01-01 PROCEDURE — 99223 1ST HOSP IP/OBS HIGH 75: CPT | Performed by: INTERNAL MEDICINE

## 2017-01-01 PROCEDURE — 85025 COMPLETE CBC W/AUTO DIFF WBC: CPT | Performed by: NURSE PRACTITIONER

## 2017-01-01 PROCEDURE — 99232 SBSQ HOSP IP/OBS MODERATE 35: CPT | Performed by: INTERNAL MEDICINE

## 2017-01-01 PROCEDURE — B41F1ZZ FLUOROSCOPY OF RIGHT LOWER EXTREMITY ARTERIES USING LOW OSMOLAR CONTRAST: ICD-10-PCS | Performed by: INTERNAL MEDICINE

## 2017-01-01 PROCEDURE — 70450 CT HEAD/BRAIN W/O DYE: CPT

## 2017-01-01 PROCEDURE — 85025 COMPLETE CBC W/AUTO DIFF WBC: CPT | Performed by: INTERNAL MEDICINE

## 2017-01-01 PROCEDURE — 99214 OFFICE O/P EST MOD 30 MIN: CPT | Performed by: NURSE PRACTITIONER

## 2017-01-01 PROCEDURE — 85027 COMPLETE CBC AUTOMATED: CPT | Performed by: NURSE PRACTITIONER

## 2017-01-01 PROCEDURE — 0W9G3ZZ DRAINAGE OF PERITONEAL CAVITY, PERCUTANEOUS APPROACH: ICD-10-PCS | Performed by: CLINICAL NURSE SPECIALIST

## 2017-01-01 PROCEDURE — 85007 BL SMEAR W/DIFF WBC COUNT: CPT | Performed by: NURSE PRACTITIONER

## 2017-01-01 PROCEDURE — 71010 XR CHEST AP PORTABLE  (CPT=71010): CPT

## 2017-01-01 PROCEDURE — 99211 OFF/OP EST MAY X REQ PHY/QHP: CPT | Performed by: CLINICAL NURSE SPECIALIST

## 2017-01-01 PROCEDURE — 80048 BASIC METABOLIC PNL TOTAL CA: CPT | Performed by: INTERNAL MEDICINE

## 2017-01-01 PROCEDURE — 99233 SBSQ HOSP IP/OBS HIGH 50: CPT | Performed by: INTERNAL MEDICINE

## 2017-01-01 PROCEDURE — 93923 UPR/LXTR ART STDY 3+ LVLS: CPT

## 2017-01-01 PROCEDURE — 99233 SBSQ HOSP IP/OBS HIGH 50: CPT | Performed by: NURSE PRACTITIONER

## 2017-01-01 PROCEDURE — 80048 BASIC METABOLIC PNL TOTAL CA: CPT | Performed by: CLINICAL NURSE SPECIALIST

## 2017-01-01 PROCEDURE — 99214 OFFICE O/P EST MOD 30 MIN: CPT | Performed by: CLINICAL NURSE SPECIALIST

## 2017-01-01 PROCEDURE — 99223 1ST HOSP IP/OBS HIGH 75: CPT | Performed by: HOSPITALIST

## 2017-01-01 PROCEDURE — 85007 BL SMEAR W/DIFF WBC COUNT: CPT | Performed by: INTERNAL MEDICINE

## 2017-01-01 PROCEDURE — 99497 ADVNCD CARE PLAN 30 MIN: CPT | Performed by: NURSE PRACTITIONER

## 2017-01-01 PROCEDURE — 4A023N6 MEASUREMENT OF CARDIAC SAMPLING AND PRESSURE, RIGHT HEART, PERCUTANEOUS APPROACH: ICD-10-PCS | Performed by: INTERNAL MEDICINE

## 2017-01-01 PROCEDURE — 85027 COMPLETE CBC AUTOMATED: CPT | Performed by: INTERNAL MEDICINE

## 2017-01-01 PROCEDURE — 30233N1 TRANSFUSION OF NONAUTOLOGOUS RED BLOOD CELLS INTO PERIPHERAL VEIN, PERCUTANEOUS APPROACH: ICD-10-PCS | Performed by: HOSPITALIST

## 2017-01-01 PROCEDURE — 4A03353 MEASUREMENT OF ARTERIAL FLOW, PULMONARY, PERCUTANEOUS APPROACH: ICD-10-PCS | Performed by: INTERNAL MEDICINE

## 2017-01-01 PROCEDURE — 99212 OFFICE O/P EST SF 10 MIN: CPT | Performed by: NURSE PRACTITIONER

## 2017-01-01 PROCEDURE — 80048 BASIC METABOLIC PNL TOTAL CA: CPT | Performed by: NURSE PRACTITIONER

## 2017-01-01 PROCEDURE — 93306 TTE W/DOPPLER COMPLETE: CPT

## 2017-01-01 PROCEDURE — 43235 EGD DIAGNOSTIC BRUSH WASH: CPT | Performed by: INTERNAL MEDICINE

## 2017-01-01 PROCEDURE — 99223 1ST HOSP IP/OBS HIGH 75: CPT | Performed by: NURSE PRACTITIONER

## 2017-01-01 PROCEDURE — 99221 1ST HOSP IP/OBS SF/LOW 40: CPT | Performed by: INTERNAL MEDICINE

## 2017-01-01 PROCEDURE — 71010 XR CHEST AP PORTABLE  (CPT=71010): CPT | Performed by: EMERGENCY MEDICINE

## 2017-01-01 PROCEDURE — 36415 COLL VENOUS BLD VENIPUNCTURE: CPT | Performed by: NURSE PRACTITIONER

## 2017-01-01 PROCEDURE — 83880 ASSAY OF NATRIURETIC PEPTIDE: CPT | Performed by: CLINICAL NURSE SPECIALIST

## 2017-01-01 PROCEDURE — 99239 HOSP IP/OBS DSCHRG MGMT >30: CPT | Performed by: HOSPITALIST

## 2017-01-01 PROCEDURE — 49083 ABD PARACENTESIS W/IMAGING: CPT

## 2017-01-01 PROCEDURE — 71020 XR CHEST PA + LAT CHEST (CPT=71020): CPT

## 2017-01-01 PROCEDURE — 96360 HYDRATION IV INFUSION INIT: CPT | Performed by: CLINICAL NURSE SPECIALIST

## 2017-01-01 PROCEDURE — 93306 TTE W/DOPPLER COMPLETE: CPT | Performed by: INTERNAL MEDICINE

## 2017-01-01 PROCEDURE — 96374 THER/PROPH/DIAG INJ IV PUSH: CPT | Performed by: NURSE PRACTITIONER

## 2017-01-01 PROCEDURE — 76705 ECHO EXAM OF ABDOMEN: CPT

## 2017-01-01 RX ORDER — DEXTROSE MONOHYDRATE 25 G/50ML
50 INJECTION, SOLUTION INTRAVENOUS AS NEEDED
Status: DISCONTINUED | OUTPATIENT
Start: 2017-01-01 | End: 2017-01-01

## 2017-01-01 RX ORDER — CLOPIDOGREL BISULFATE 75 MG/1
75 TABLET ORAL DAILY
Status: ON HOLD | COMMUNITY
End: 2017-01-01

## 2017-01-01 RX ORDER — SPIRONOLACTONE 25 MG/1
12.5 TABLET ORAL DAILY
Status: DISCONTINUED | OUTPATIENT
Start: 2017-01-01 | End: 2017-01-01

## 2017-01-01 RX ORDER — ACETAMINOPHEN AND CODEINE PHOSPHATE 300; 30 MG/1; MG/1
1 TABLET ORAL EVERY 8 HOURS PRN
Status: DISCONTINUED | OUTPATIENT
Start: 2017-01-01 | End: 2017-01-01

## 2017-01-01 RX ORDER — HEPARIN SODIUM 1000 [USP'U]/ML
5000 INJECTION, SOLUTION INTRAVENOUS; SUBCUTANEOUS EVERY 8 HOURS
Status: DISCONTINUED | OUTPATIENT
Start: 2017-01-01 | End: 2017-01-01

## 2017-01-01 RX ORDER — POTASSIUM CHLORIDE 20 MEQ/1
40 TABLET, EXTENDED RELEASE ORAL ONCE
Status: COMPLETED | OUTPATIENT
Start: 2017-01-01 | End: 2017-01-01

## 2017-01-01 RX ORDER — ACETAMINOPHEN 160 MG/5ML
650 SOLUTION ORAL EVERY 6 HOURS SCHEDULED
Status: DISCONTINUED | OUTPATIENT
Start: 2017-01-01 | End: 2017-01-01

## 2017-01-01 RX ORDER — AMIODARONE HYDROCHLORIDE 200 MG/1
200 TABLET ORAL NIGHTLY
Status: DISCONTINUED | OUTPATIENT
Start: 2017-01-01 | End: 2017-01-01

## 2017-01-01 RX ORDER — FUROSEMIDE 10 MG/ML
20 INJECTION INTRAMUSCULAR; INTRAVENOUS ONCE
Status: COMPLETED | OUTPATIENT
Start: 2017-01-01 | End: 2017-01-01

## 2017-01-01 RX ORDER — BUMETANIDE 1 MG/1
2 TABLET ORAL 2 TIMES DAILY
Qty: 30 TABLET | Refills: 5 | Status: ON HOLD | COMMUNITY
Start: 2017-01-01 | End: 2017-01-01

## 2017-01-01 RX ORDER — ACETAMINOPHEN AND CODEINE PHOSPHATE 300; 30 MG/1; MG/1
1 TABLET ORAL EVERY 4 HOURS PRN
Qty: 30 TABLET | Refills: 0 | Status: ON HOLD | OUTPATIENT
Start: 2017-01-01 | End: 2017-01-01

## 2017-01-01 RX ORDER — PSEUDOEPHEDRINE HCL 30 MG
100 TABLET ORAL 2 TIMES DAILY PRN
Qty: 30 CAPSULE | Refills: 0 | Status: ON HOLD | OUTPATIENT
Start: 2017-01-01 | End: 2017-01-01

## 2017-01-01 RX ORDER — PANTOPRAZOLE SODIUM 40 MG/1
40 TABLET, DELAYED RELEASE ORAL
Status: DISCONTINUED | OUTPATIENT
Start: 2017-01-01 | End: 2017-01-01

## 2017-01-01 RX ORDER — BUMETANIDE 0.25 MG/ML
1 INJECTION, SOLUTION INTRAMUSCULAR; INTRAVENOUS ONCE
Status: COMPLETED | OUTPATIENT
Start: 2017-01-01 | End: 2017-01-01

## 2017-01-01 RX ORDER — POTASSIUM CHLORIDE 20 MEQ/1
40 TABLET, EXTENDED RELEASE ORAL EVERY 4 HOURS
Status: COMPLETED | OUTPATIENT
Start: 2017-01-01 | End: 2017-01-01

## 2017-01-01 RX ORDER — LORAZEPAM 2 MG/ML
0.5 INJECTION INTRAMUSCULAR EVERY 4 HOURS PRN
Status: DISCONTINUED | OUTPATIENT
Start: 2017-01-01 | End: 2017-01-01

## 2017-01-01 RX ORDER — SODIUM POLYSTYRENE SULFONATE 15 G/60ML
15 SUSPENSION ORAL; RECTAL ONCE
Status: COMPLETED | OUTPATIENT
Start: 2017-01-01 | End: 2017-01-01

## 2017-01-01 RX ORDER — PANTOPRAZOLE SODIUM 20 MG/1
20 TABLET, DELAYED RELEASE ORAL
Status: DISCONTINUED | OUTPATIENT
Start: 2017-01-01 | End: 2017-01-01

## 2017-01-01 RX ORDER — CARVEDILOL 3.12 MG/1
3.12 TABLET ORAL 2 TIMES DAILY WITH MEALS
Status: DISCONTINUED | OUTPATIENT
Start: 2017-01-01 | End: 2017-01-01

## 2017-01-01 RX ORDER — ACETAMINOPHEN 325 MG/1
650 TABLET ORAL EVERY 6 HOURS PRN
Status: DISCONTINUED | OUTPATIENT
Start: 2017-01-01 | End: 2017-01-01

## 2017-01-01 RX ORDER — HALOPERIDOL 5 MG/ML
2 INJECTION INTRAMUSCULAR
Status: DISCONTINUED | OUTPATIENT
Start: 2017-01-01 | End: 2017-01-01

## 2017-01-01 RX ORDER — DOBUTAMINE HYDROCHLORIDE 100 MG/100ML
1.5 INJECTION INTRAVENOUS CONTINUOUS
Status: DISCONTINUED | OUTPATIENT
Start: 2017-01-01 | End: 2017-01-01

## 2017-01-01 RX ORDER — CARVEDILOL 3.12 MG/1
3.12 TABLET ORAL 2 TIMES DAILY WITH MEALS
Qty: 60 TABLET | Refills: 5 | Status: ON HOLD | OUTPATIENT
Start: 2017-01-01 | End: 2017-01-01

## 2017-01-01 RX ORDER — DEXTROSE AND SODIUM CHLORIDE 5; .45 G/100ML; G/100ML
INJECTION, SOLUTION INTRAVENOUS CONTINUOUS
Status: DISCONTINUED | OUTPATIENT
Start: 2017-01-01 | End: 2017-01-01

## 2017-01-01 RX ORDER — FUROSEMIDE 10 MG/ML
40 INJECTION INTRAMUSCULAR; INTRAVENOUS 3 TIMES DAILY
Status: DISCONTINUED | OUTPATIENT
Start: 2017-01-01 | End: 2017-01-01

## 2017-01-01 RX ORDER — ASPIRIN 325 MG
325 TABLET, DELAYED RELEASE (ENTERIC COATED) ORAL DAILY
Status: DISCONTINUED | OUTPATIENT
Start: 2017-01-01 | End: 2017-01-01

## 2017-01-01 RX ORDER — SODIUM CHLORIDE 9 MG/ML
500 INJECTION, SOLUTION INTRAVENOUS CONTINUOUS
Status: DISCONTINUED | OUTPATIENT
Start: 2017-01-01 | End: 2017-01-01

## 2017-01-01 RX ORDER — MIDODRINE HYDROCHLORIDE 5 MG/1
2.5 TABLET ORAL
Status: DISCONTINUED | OUTPATIENT
Start: 2017-01-01 | End: 2017-01-01

## 2017-01-01 RX ORDER — HALOPERIDOL 5 MG/ML
1 INJECTION INTRAMUSCULAR
Status: DISCONTINUED | OUTPATIENT
Start: 2017-01-01 | End: 2017-01-01

## 2017-01-01 RX ORDER — OMEPRAZOLE 20 MG/1
20 CAPSULE, DELAYED RELEASE ORAL
Qty: 30 CAPSULE | Refills: 0 | Status: ON HOLD | OUTPATIENT
Start: 2017-01-01 | End: 2017-01-01

## 2017-01-01 RX ORDER — LACTULOSE 10 G/15ML
10 SOLUTION ORAL 3 TIMES DAILY
Status: DISCONTINUED | OUTPATIENT
Start: 2017-01-01 | End: 2017-01-01

## 2017-01-01 RX ORDER — DOCUSATE SODIUM 100 MG/1
100 CAPSULE, LIQUID FILLED ORAL 2 TIMES DAILY PRN
Status: DISCONTINUED | OUTPATIENT
Start: 2017-01-01 | End: 2017-01-01

## 2017-01-01 RX ORDER — SODIUM POLYSTYRENE SULFONATE 15 G/60ML
15 SUSPENSION ORAL; RECTAL ONCE
Status: DISCONTINUED | OUTPATIENT
Start: 2017-01-01 | End: 2017-01-01

## 2017-01-01 RX ORDER — CHOLECALCIFEROL (VITAMIN D3) 125 MCG
1000 CAPSULE ORAL DAILY
Status: DISCONTINUED | OUTPATIENT
Start: 2017-01-01 | End: 2017-01-01

## 2017-01-01 RX ORDER — FUROSEMIDE 10 MG/ML
40 INJECTION INTRAMUSCULAR; INTRAVENOUS ONCE
Status: COMPLETED | OUTPATIENT
Start: 2017-01-01 | End: 2017-01-01

## 2017-01-01 RX ORDER — BUMETANIDE 1 MG/1
1 TABLET ORAL DAILY
Status: DISCONTINUED | OUTPATIENT
Start: 2017-01-01 | End: 2017-01-01

## 2017-01-01 RX ORDER — SODIUM CHLORIDE 9 MG/ML
INJECTION, SOLUTION INTRAVENOUS ONCE
Status: COMPLETED | OUTPATIENT
Start: 2017-01-01 | End: 2017-01-01

## 2017-01-01 RX ORDER — SPIRONOLACTONE 25 MG/1
12.5 TABLET ORAL DAILY
Qty: 15 TABLET | Refills: 5 | Status: ON HOLD | OUTPATIENT
Start: 2017-01-01 | End: 2017-01-01

## 2017-01-01 RX ORDER — NYSTATIN 100000 U/G
CREAM TOPICAL 2 TIMES DAILY
Status: DISCONTINUED | OUTPATIENT
Start: 2017-01-01 | End: 2017-01-01

## 2017-01-01 RX ORDER — BUMETANIDE 0.25 MG/ML
INJECTION, SOLUTION INTRAMUSCULAR; INTRAVENOUS
Status: COMPLETED
Start: 2017-01-01 | End: 2017-01-01

## 2017-01-01 RX ORDER — DOBUTAMINE HYDROCHLORIDE 100 MG/100ML
3 INJECTION INTRAVENOUS CONTINUOUS
Status: DISCONTINUED | OUTPATIENT
Start: 2017-01-01 | End: 2017-01-01

## 2017-01-01 RX ORDER — LORAZEPAM 2 MG/ML
2 INJECTION INTRAMUSCULAR EVERY 4 HOURS PRN
Status: DISCONTINUED | OUTPATIENT
Start: 2017-01-01 | End: 2017-01-01

## 2017-01-01 RX ORDER — LIDOCAINE HYDROCHLORIDE 10 MG/ML
INJECTION, SOLUTION EPIDURAL; INFILTRATION; INTRACAUDAL; PERINEURAL AS NEEDED
Status: DISCONTINUED | OUTPATIENT
Start: 2017-01-01 | End: 2017-01-01 | Stop reason: SURG

## 2017-01-01 RX ORDER — HEPARIN SODIUM 5000 [USP'U]/ML
5000 INJECTION, SOLUTION INTRAVENOUS; SUBCUTANEOUS EVERY 8 HOURS SCHEDULED
Status: DISCONTINUED | OUTPATIENT
Start: 2017-01-01 | End: 2017-01-01

## 2017-01-01 RX ORDER — NITROGLYCERIN 0.4 MG/1
0.4 TABLET SUBLINGUAL EVERY 5 MIN PRN
Qty: 25 TABLET | Refills: 1 | Status: SHIPPED | OUTPATIENT
Start: 2017-01-01 | End: 2017-01-01 | Stop reason: CLARIF

## 2017-01-01 RX ORDER — FUROSEMIDE 10 MG/ML
INJECTION INTRAMUSCULAR; INTRAVENOUS
Status: COMPLETED
Start: 2017-01-01 | End: 2017-01-01

## 2017-01-01 RX ORDER — ALBUTEROL SULFATE 2.5 MG/3ML
2.5 SOLUTION RESPIRATORY (INHALATION)
Status: DISCONTINUED | OUTPATIENT
Start: 2017-01-01 | End: 2017-01-01

## 2017-01-01 RX ORDER — ONDANSETRON 2 MG/ML
4 INJECTION INTRAMUSCULAR; INTRAVENOUS EVERY 6 HOURS PRN
Status: DISCONTINUED | OUTPATIENT
Start: 2017-01-01 | End: 2017-01-01

## 2017-01-01 RX ORDER — GLYCOPYRROLATE 0.2 MG/ML
0.4 INJECTION, SOLUTION INTRAMUSCULAR; INTRAVENOUS
Status: DISCONTINUED | OUTPATIENT
Start: 2017-01-01 | End: 2017-01-01

## 2017-01-01 RX ORDER — ASPIRIN 81 MG/1
81 TABLET ORAL DAILY
Status: DISCONTINUED | OUTPATIENT
Start: 2017-01-01 | End: 2017-01-01

## 2017-01-01 RX ORDER — AMIODARONE HYDROCHLORIDE 100 MG/1
100 TABLET ORAL NIGHTLY
Qty: 30 TABLET | Refills: 5 | Status: ON HOLD | OUTPATIENT
Start: 2017-01-01 | End: 2017-01-01

## 2017-01-01 RX ORDER — ACETAMINOPHEN 325 MG/1
650 TABLET ORAL ONCE
Status: COMPLETED | OUTPATIENT
Start: 2017-01-01 | End: 2017-01-01

## 2017-01-01 RX ORDER — AMIODARONE HYDROCHLORIDE 200 MG/1
100 TABLET ORAL NIGHTLY
Status: DISCONTINUED | OUTPATIENT
Start: 2017-01-01 | End: 2017-01-01

## 2017-01-01 RX ORDER — SACCHAROMYCES BOULARDII 250 MG
250 CAPSULE ORAL 2 TIMES DAILY
Status: DISCONTINUED | OUTPATIENT
Start: 2017-01-01 | End: 2017-01-01

## 2017-01-01 RX ORDER — AMIODARONE HYDROCHLORIDE 200 MG/1
100 TABLET ORAL NIGHTLY
Refills: 0 | Status: ON HOLD | COMMUNITY
Start: 2017-01-01 | End: 2017-01-01

## 2017-01-01 RX ORDER — DIGOXIN 125 MCG
125 TABLET ORAL SEE ADMIN INSTRUCTIONS
Status: ON HOLD | COMMUNITY
End: 2017-01-01

## 2017-01-01 RX ORDER — ONDANSETRON 4 MG/1
4 TABLET, ORALLY DISINTEGRATING ORAL EVERY 6 HOURS PRN
Status: DISCONTINUED | OUTPATIENT
Start: 2017-01-01 | End: 2017-01-01

## 2017-01-01 RX ORDER — NITROGLYCERIN 0.4 MG/1
0.4 TABLET SUBLINGUAL EVERY 5 MIN PRN
Status: DISCONTINUED | OUTPATIENT
Start: 2017-01-01 | End: 2017-01-01

## 2017-01-01 RX ORDER — ACETAMINOPHEN AND CODEINE PHOSPHATE 300; 30 MG/1; MG/1
1 TABLET ORAL EVERY 4 HOURS PRN
Status: DISCONTINUED | OUTPATIENT
Start: 2017-01-01 | End: 2017-01-01

## 2017-01-01 RX ORDER — FOLIC ACID 1 MG/1
1 TABLET ORAL DAILY
Status: DISCONTINUED | OUTPATIENT
Start: 2017-01-01 | End: 2017-01-01

## 2017-01-01 RX ORDER — BISACODYL 10 MG
10 SUPPOSITORY, RECTAL RECTAL
Status: DISCONTINUED | OUTPATIENT
Start: 2017-01-01 | End: 2017-01-01

## 2017-01-01 RX ORDER — FOLIC ACID 1 MG/1
1 TABLET ORAL DAILY
Status: ON HOLD | COMMUNITY
End: 2017-01-01

## 2017-01-01 RX ORDER — BUMETANIDE 2 MG/1
2 TABLET ORAL SEE ADMIN INSTRUCTIONS
COMMUNITY
End: 2017-01-01

## 2017-01-01 RX ORDER — OMEPRAZOLE 20 MG/1
20 CAPSULE, DELAYED RELEASE ORAL
Status: DISCONTINUED | OUTPATIENT
Start: 2017-01-01 | End: 2017-01-01 | Stop reason: SDUPTHER

## 2017-01-01 RX ORDER — BUMETANIDE 1 MG/1
1 TABLET ORAL 2 TIMES DAILY
Status: DISCONTINUED | OUTPATIENT
Start: 2017-01-01 | End: 2017-01-01

## 2017-01-01 RX ORDER — SCOLOPAMINE TRANSDERMAL SYSTEM 1 MG/1
1 PATCH, EXTENDED RELEASE TRANSDERMAL
Status: DISCONTINUED | OUTPATIENT
Start: 2017-01-01 | End: 2017-01-01

## 2017-01-01 RX ORDER — MIDAZOLAM HYDROCHLORIDE 1 MG/ML
INJECTION INTRAMUSCULAR; INTRAVENOUS
Status: COMPLETED
Start: 2017-01-01 | End: 2017-01-01

## 2017-01-01 RX ORDER — ALPRAZOLAM 0.25 MG/1
0.25 TABLET ORAL NIGHTLY PRN
Status: DISCONTINUED | OUTPATIENT
Start: 2017-01-01 | End: 2017-01-01

## 2017-01-01 RX ORDER — ACETAMINOPHEN 650 MG/1
650 SUPPOSITORY RECTAL EVERY 6 HOURS SCHEDULED
Status: DISCONTINUED | OUTPATIENT
Start: 2017-01-01 | End: 2017-01-01

## 2017-01-01 RX ORDER — ALPRAZOLAM 0.25 MG/1
0.25 TABLET ORAL NIGHTLY PRN
Qty: 30 TABLET | Refills: 0 | Status: ON HOLD | OUTPATIENT
Start: 2017-01-01 | End: 2017-01-01

## 2017-01-01 RX ORDER — LORAZEPAM 2 MG/ML
1 INJECTION INTRAMUSCULAR EVERY 4 HOURS PRN
Status: DISCONTINUED | OUTPATIENT
Start: 2017-01-01 | End: 2017-01-01

## 2017-01-01 RX ORDER — ALBUTEROL SULFATE 2.5 MG/3ML
2.5 SOLUTION RESPIRATORY (INHALATION) EVERY 4 HOURS PRN
Status: DISCONTINUED | OUTPATIENT
Start: 2017-01-01 | End: 2017-01-01

## 2017-01-01 RX ORDER — ACETAMINOPHEN 160 MG/5ML
650 SOLUTION ORAL EVERY 4 HOURS PRN
Status: DISCONTINUED | OUTPATIENT
Start: 2017-01-01 | End: 2017-01-01

## 2017-01-01 RX ORDER — ASPIRIN 325 MG
325 TABLET, DELAYED RELEASE (ENTERIC COATED) ORAL DAILY
COMMUNITY
End: 2017-01-01 | Stop reason: ALTCHOICE

## 2017-01-01 RX ORDER — POTASSIUM CHLORIDE 14.9 MG/ML
20 INJECTION INTRAVENOUS ONCE
Status: COMPLETED | OUTPATIENT
Start: 2017-01-01 | End: 2017-01-01

## 2017-01-01 RX ORDER — ATROPINE SULFATE 10 MG/ML
2 SOLUTION/ DROPS OPHTHALMIC EVERY 2 HOUR PRN
Status: DISCONTINUED | OUTPATIENT
Start: 2017-01-01 | End: 2017-01-01

## 2017-01-01 RX ORDER — CALCITONIN SALMON 200 [USP'U]/ML
4 INJECTION, SOLUTION INTRAMUSCULAR; SUBCUTANEOUS ONCE
Status: COMPLETED | OUTPATIENT
Start: 2017-01-01 | End: 2017-01-01

## 2017-01-01 RX ORDER — SODIUM CHLORIDE 0.9 % (FLUSH) 0.9 %
10 SYRINGE (ML) INJECTION AS NEEDED
Status: DISCONTINUED | OUTPATIENT
Start: 2017-01-01 | End: 2017-01-01

## 2017-01-01 RX ORDER — CARVEDILOL 6.25 MG/1
6.25 TABLET ORAL 2 TIMES DAILY WITH MEALS
Status: DISCONTINUED | OUTPATIENT
Start: 2017-01-01 | End: 2017-01-01

## 2017-01-01 RX ORDER — ISOSORBIDE DINITRATE 5 MG/1
5 TABLET ORAL
Qty: 90 TABLET | Refills: 5 | Status: ON HOLD | OUTPATIENT
Start: 2017-01-01 | End: 2017-01-01

## 2017-01-01 RX ORDER — MORPHINE SULFATE 2 MG/ML
1 INJECTION, SOLUTION INTRAMUSCULAR; INTRAVENOUS
Status: DISCONTINUED | OUTPATIENT
Start: 2017-01-01 | End: 2017-01-01

## 2017-01-01 RX ORDER — HYDRALAZINE HYDROCHLORIDE 10 MG/1
10 TABLET, FILM COATED ORAL EVERY 8 HOURS SCHEDULED
Qty: 90 TABLET | Refills: 5 | Status: ON HOLD | OUTPATIENT
Start: 2017-01-01 | End: 2017-01-01

## 2017-01-01 RX ORDER — FUROSEMIDE 10 MG/ML
80 INJECTION INTRAMUSCULAR; INTRAVENOUS ONCE
Status: COMPLETED | OUTPATIENT
Start: 2017-01-01 | End: 2017-01-01

## 2017-01-01 RX ORDER — SODIUM CHLORIDE 9 MG/ML
INJECTION, SOLUTION INTRAVENOUS
Status: DISPENSED
Start: 2017-01-01 | End: 2017-01-01

## 2017-01-01 RX ORDER — SPIRONOLACTONE 25 MG/1
25 TABLET ORAL DAILY
Status: DISCONTINUED | OUTPATIENT
Start: 2017-01-01 | End: 2017-01-01

## 2017-01-01 RX ORDER — SODIUM CHLORIDE 9 MG/ML
INJECTION, SOLUTION INTRAVENOUS ONCE
Status: DISCONTINUED | OUTPATIENT
Start: 2017-01-01 | End: 2017-01-01

## 2017-01-01 RX ORDER — SODIUM CHLORIDE 9 MG/ML
INJECTION, SOLUTION INTRAVENOUS
Status: COMPLETED
Start: 2017-01-01 | End: 2017-01-01

## 2017-01-01 RX ORDER — ACETAMINOPHEN 650 MG/1
650 SUPPOSITORY RECTAL EVERY 4 HOURS PRN
Status: DISCONTINUED | OUTPATIENT
Start: 2017-01-01 | End: 2017-01-01

## 2017-01-01 RX ORDER — BUMETANIDE 0.25 MG/ML
3 INJECTION, SOLUTION INTRAMUSCULAR; INTRAVENOUS ONCE
Status: COMPLETED | OUTPATIENT
Start: 2017-01-01 | End: 2017-01-01

## 2017-01-01 RX ORDER — DOBUTAMINE HYDROCHLORIDE 100 MG/100ML
5 INJECTION INTRAVENOUS CONTINUOUS
Status: DISCONTINUED | OUTPATIENT
Start: 2017-01-01 | End: 2017-01-01

## 2017-01-01 RX ORDER — MIDODRINE HYDROCHLORIDE 2.5 MG/1
2.5 TABLET ORAL
Qty: 60 TABLET | Refills: 5 | Status: ON HOLD | OUTPATIENT
Start: 2017-01-01 | End: 2017-01-01

## 2017-01-01 RX ORDER — CLOPIDOGREL BISULFATE 75 MG/1
75 TABLET ORAL DAILY
Status: DISCONTINUED | OUTPATIENT
Start: 2017-01-01 | End: 2017-01-01

## 2017-01-01 RX ORDER — LEVOTHYROXINE SODIUM 112 UG/1
112 TABLET ORAL
Status: DISCONTINUED | OUTPATIENT
Start: 2017-01-01 | End: 2017-01-01

## 2017-01-01 RX ORDER — CARVEDILOL 12.5 MG/1
12.5 TABLET ORAL 2 TIMES DAILY WITH MEALS
Status: DISCONTINUED | OUTPATIENT
Start: 2017-01-01 | End: 2017-01-01

## 2017-01-01 RX ORDER — SODIUM PHOSPHATE, DIBASIC AND SODIUM PHOSPHATE, MONOBASIC 7; 19 G/133ML; G/133ML
1 ENEMA RECTAL ONCE AS NEEDED
Status: ACTIVE | OUTPATIENT
Start: 2017-01-01 | End: 2017-01-01

## 2017-01-01 RX ORDER — BUMETANIDE 2 MG/1
2 TABLET ORAL SEE ADMIN INSTRUCTIONS
Refills: 0 | COMMUNITY
Start: 2017-01-01 | End: 2017-01-01

## 2017-01-01 RX ORDER — SPIRONOLACTONE 25 MG/1
25 TABLET ORAL DAILY
Status: ON HOLD | COMMUNITY
End: 2017-01-01

## 2017-01-01 RX ORDER — HEPARIN SODIUM 5000 [USP'U]/ML
5000 INJECTION, SOLUTION INTRAVENOUS; SUBCUTANEOUS EVERY 12 HOURS SCHEDULED
Status: DISCONTINUED | OUTPATIENT
Start: 2017-01-01 | End: 2017-01-01

## 2017-01-01 RX ORDER — METOLAZONE 5 MG/1
5 TABLET ORAL ONCE
Status: COMPLETED | OUTPATIENT
Start: 2017-01-01 | End: 2017-01-01

## 2017-01-01 RX ORDER — CASTOR OIL AND BALSAM, PERU 788; 87 MG/G; MG/G
OINTMENT TOPICAL 2 TIMES DAILY PRN
Status: DISCONTINUED | OUTPATIENT
Start: 2017-01-01 | End: 2017-01-01

## 2017-01-01 RX ORDER — HYDRALAZINE HYDROCHLORIDE 10 MG/1
10 TABLET, FILM COATED ORAL EVERY 8 HOURS SCHEDULED
Status: DISCONTINUED | OUTPATIENT
Start: 2017-01-01 | End: 2017-01-01

## 2017-01-01 RX ORDER — CHLORHEXIDINE GLUCONATE 4 G/100ML
30 SOLUTION TOPICAL
Status: ACTIVE | OUTPATIENT
Start: 2017-01-01 | End: 2017-01-01

## 2017-01-01 RX ORDER — MORPHINE SULFATE IN 0.9 % NACL 1 MG/ML
1 PLASTIC BAG, INJECTION (ML) INTRAVENOUS CONTINUOUS PRN
Status: DISCONTINUED | OUTPATIENT
Start: 2017-01-01 | End: 2017-01-01

## 2017-01-01 RX ORDER — BUMETANIDE 1 MG/1
1 TABLET ORAL 2 TIMES DAILY
Status: COMPLETED | OUTPATIENT
Start: 2017-01-01 | End: 2017-01-01

## 2017-01-01 RX ORDER — SODIUM CHLORIDE 0.9 % (FLUSH) 0.9 %
SYRINGE (ML) INJECTION
Status: COMPLETED
Start: 2017-01-01 | End: 2017-01-01

## 2017-01-01 RX ORDER — MIDODRINE HYDROCHLORIDE 2.5 MG/1
2.5 TABLET ORAL
Status: DISCONTINUED | OUTPATIENT
Start: 2017-01-01 | End: 2017-01-01

## 2017-01-01 RX ORDER — BUMETANIDE 2 MG/1
2 TABLET ORAL DAILY
Refills: 0 | COMMUNITY
Start: 2017-01-01 | End: 2017-01-01

## 2017-01-01 RX ORDER — DOCUSATE SODIUM 100 MG/1
100 CAPSULE, LIQUID FILLED ORAL 2 TIMES DAILY
Status: DISCONTINUED | OUTPATIENT
Start: 2017-01-01 | End: 2017-01-01

## 2017-01-01 RX ORDER — 0.9 % SODIUM CHLORIDE 0.9 %
VIAL (ML) INJECTION
Status: DISCONTINUED
Start: 2017-01-01 | End: 2017-01-01

## 2017-01-01 RX ORDER — HEPARIN SODIUM 5000 [USP'U]/ML
5000 INJECTION, SOLUTION INTRAVENOUS; SUBCUTANEOUS EVERY 12 HOURS
Status: DISCONTINUED | OUTPATIENT
Start: 2017-01-01 | End: 2017-01-01

## 2017-01-01 RX ORDER — AMIODARONE HYDROCHLORIDE 200 MG/1
200 TABLET ORAL DAILY
Status: DISCONTINUED | OUTPATIENT
Start: 2017-01-01 | End: 2017-01-01

## 2017-01-01 RX ORDER — ONDANSETRON 2 MG/ML
4 INJECTION INTRAMUSCULAR; INTRAVENOUS ONCE
Status: COMPLETED | OUTPATIENT
Start: 2017-01-01 | End: 2017-01-01

## 2017-01-01 RX ORDER — LACTULOSE 10 G/15ML
10 SOLUTION ORAL 3 TIMES DAILY
Qty: 1350 ML | Refills: 0 | Status: SHIPPED | OUTPATIENT
Start: 2017-01-01 | End: 2017-01-01

## 2017-01-01 RX ORDER — ISOSORBIDE DINITRATE 5 MG/1
5 TABLET ORAL
Status: DISCONTINUED | OUTPATIENT
Start: 2017-01-01 | End: 2017-01-01

## 2017-01-01 RX ORDER — POLYVINYL ALCOHOL 14 MG/ML
2 SOLUTION/ DROPS OPHTHALMIC
Status: DISCONTINUED | OUTPATIENT
Start: 2017-01-01 | End: 2017-01-01

## 2017-01-01 RX ORDER — BUMETANIDE 1 MG/1
2 TABLET ORAL DAILY
Status: DISCONTINUED | OUTPATIENT
Start: 2017-01-01 | End: 2017-01-01

## 2017-01-01 RX ORDER — BUMETANIDE 2 MG/1
2 TABLET ORAL 3 TIMES DAILY
Refills: 0 | Status: ON HOLD | COMMUNITY
Start: 2017-01-01 | End: 2017-01-01

## 2017-01-01 RX ORDER — METOLAZONE 2.5 MG/1
2.5 TABLET ORAL SEE ADMIN INSTRUCTIONS
Status: ON HOLD | COMMUNITY
End: 2017-01-01

## 2017-01-01 RX ORDER — LIDOCAINE HYDROCHLORIDE 20 MG/ML
INJECTION, SOLUTION EPIDURAL; INFILTRATION; INTRACAUDAL; PERINEURAL
Status: COMPLETED
Start: 2017-01-01 | End: 2017-01-01

## 2017-01-01 RX ORDER — FUROSEMIDE 10 MG/ML
40 INJECTION INTRAMUSCULAR; INTRAVENOUS 2 TIMES DAILY
Status: DISCONTINUED | OUTPATIENT
Start: 2017-01-01 | End: 2017-01-01

## 2017-01-01 RX ORDER — METOLAZONE 5 MG/1
2.5 TABLET ORAL
Status: ON HOLD | COMMUNITY
Start: 2017-01-01 | End: 2017-01-01

## 2017-01-01 RX ORDER — POLYETHYLENE GLYCOL 3350 17 G/17G
17 POWDER, FOR SOLUTION ORAL DAILY PRN
Status: DISCONTINUED | OUTPATIENT
Start: 2017-01-01 | End: 2017-01-01

## 2017-01-01 RX ORDER — BUMETANIDE 1 MG/1
1 TABLET ORAL DAILY
Qty: 30 TABLET | Refills: 5 | Status: SHIPPED | OUTPATIENT
Start: 2017-01-01 | End: 2017-01-01

## 2017-01-01 RX ADMIN — SODIUM CHLORIDE: 9 INJECTION, SOLUTION INTRAVENOUS at 13:30:00

## 2017-01-01 RX ADMIN — BUMETANIDE 2 MG: 0.25 INJECTION, SOLUTION INTRAMUSCULAR; INTRAVENOUS at 15:37:00

## 2017-01-01 RX ADMIN — BUMETANIDE 2 MG: 0.25 INJECTION, SOLUTION INTRAMUSCULAR; INTRAVENOUS at 16:00:00

## 2017-01-01 RX ADMIN — LIDOCAINE HYDROCHLORIDE 25 MG: 10 INJECTION, SOLUTION EPIDURAL; INFILTRATION; INTRACAUDAL; PERINEURAL at 13:15:00

## 2017-01-01 RX ADMIN — BUMETANIDE 2 MG: 0.25 INJECTION, SOLUTION INTRAMUSCULAR; INTRAVENOUS at 15:07:00

## 2017-01-31 PROBLEM — R18.8 OTHER ASCITES: Status: ACTIVE | Noted: 2017-01-01

## 2017-01-31 PROBLEM — R60.1 ANASARCA: Status: ACTIVE | Noted: 2017-01-01

## 2017-01-31 PROBLEM — I50.9 ACUTE ON CHRONIC CONGESTIVE HEART FAILURE, UNSPECIFIED CONGESTIVE HEART FAILURE TYPE: Status: ACTIVE | Noted: 2017-01-01

## 2017-01-31 PROBLEM — D64.9 ANEMIA, UNSPECIFIED TYPE: Status: ACTIVE | Noted: 2017-01-01

## 2017-01-31 NOTE — HISTORICAL OFFICE NOTE
Shade Rojas  : 1935  MRNO: [id number]  [id number]  PCP: Dr. Red Jacob     TODAY'S DATE: 2008     CHIEF COMPLAINT: Followup of CAD, of native vessels, Followup of Cardiomyopathy, ischemic and Followup of Hypercholesterolemia.       HIS mouth daily; Vytorin 10 mg / 80 mg, 1 by mouth daily at bedtime; warfarin 2 mg, per [facility]    VITAL SIGNS: B/P - 117/68, Pulse - 63, Respiration - 20, Weight - 196.00  CONS: overweight. EYES: conjunctivae not injected and no xanthelasma.  ENT: mucosa pink a was encouraged to try to exercise more to lose some weight. He is a nonsmoker. Mr. Joaquin Siddiqi also has a history of left carotid endarterectomy.   He has not completed his carotid ultrasound as previously prescribed, and I re-prescribed a bilateral carotid d ON THE RIGHT:   On the right, the common carotid had diffuse complex plaque in areas greater than 3 mm in thickness with normal velocities.   The external carotid artery had complex plaque with velocity of 188 cm/sec consistent with stenosis of greater th concentric left ventricular hypertrophy. The right ventricle appears to be in normal in size with normal function. The right atrium is normal in size. The left atrium is moderately enlarged.  All three leaflets of the aortic valve were not visualized, tejas regurgitation or periprosthetic leak. 6. There is mild tricuspid regurgitation. 7. Trivial pulmonic insufficiency.    8. There is a mobile linear echodensity noted in the ventricular size and left ventricular cavity along the anterior mitral annulus and 1           1.7/10%     3:00  100 68  118                         2           1.9/11%     0:20          126                         3           3.4/14%                                               4           4.2/16%

## 2017-01-31 NOTE — ED INITIAL ASSESSMENT (HPI)
Patient sent by Dr. Raquel Antunez ---for drainage from abdomen---pale, weak, ascites, hypoxia, and lower extremity edema

## 2017-01-31 NOTE — ED PROVIDER NOTES
Patient Seen in: [facility] AND [facility] Emergency Department    History   Patient presents with:  Swelling    Stated Complaint: DRAINAGE FROM ABDOMEN    HPI    25-year-old male presents for complaint of bilateral lower extremity swelling, abdominal distention file.      Smoking Status: Former Smoker                   Packs/Day: 0.00  Years:           Types: Cigarettes    Alcohol Use: Yes           1.2 oz/week       2 Standard drinks or equivalent per week       Comment: on occasion      Review of Systems   Cons and intact. Psychiatric: He has a normal mood and affect. His speech is normal and behavior is normal.   Nursing note and vitals reviewed.             ED Course     Labs Reviewed   BASIC METABOLIC PANEL (8) - Abnormal; Notable for the following:     Gluco according to his primary care physician. This may be secondary to CHF and dilution. He has no reports of any rectal bleeding. He is no longer on his Coumadin. Abdominal exam is benign. Do not suspect SBP. This is likely secondary to cardiac failure.

## 2017-02-01 NOTE — CONSULTS
Ren[facility] Car[facility]  Report of GI Consultation    Lovella Husbands Patient Status:  Inpatient    1935 MRN [id number]   Location [facility] 3W/SW Attending Reshma Gan MD   Hosp Day # 1 PCP Cortez Ward     Date of Admission:   by PCP Dr. Sampson Antunez to the emergency room yesterday afternoon January 31st for evaluation of anasarca and ascites, ?hypoxia. He states that about 3 weeks ago his abdomen became increasingly distended to the point of tense and uncomfortable.   Very troubling nitroGLYCERIN (NITROSTAT) SL tab 0.4 mg 0.4 mg Sublingual Q5 Min PRN   Normal Saline Flush 0.9 % injection 10 mL 10 mL Intravenous PRN   furosemide (LASIX) injection 40 mg 40 mg Intravenous BID   Normal Saline Flush 0.9 % injection 10 mL 10 mL Intravenou poor memory.   HEENT: Normocephalic; pupils equally round and reactive to light; ++temporal wasting; no thyromegaly or cervical lymphadenopathy  PULM: Lungs clear to auscultation anteriorly  CV: RRR  ABD: Normoactive bowel sounds; tense / distended but nont enteroscopy. 4.  Comes in on Bumex diuretic. Would pursue diuresis as tolerated by his mildly compromised renal function with initial GFR 45. As the primary problem here is cardiac, I will defer diuretic recommendations to the cardiology service.     Jailene Manning

## 2017-02-01 NOTE — PROGRESS NOTES
[facility]    Progress Note    Carolina Saenz Patient Status:  Inpatient    1935 MRN [id number]   Location [facility] 3W/SW Attending Irina Ma, [address] Day # 1 PCP Garcia Baker       Subjective:   Carolina Saenz i Oral Nightly   carvedilol (COREG) tab 6.25 mg 6.25 mg Oral BID with meals         Lab Results  Component Value Date   WBC 7.0 02/01/2017   HGB 9.6* 02/01/2017   HCT 28.6* 02/01/2017    02/01/2017   CREATSERUM 1.51* 02/01/2017   BUN 27* 02/01/2017     Progressive anasarca and ascites, most likely related to left and right-sided heart failure that is affecting the liver.  Rule out possible liver cirrhosis.   - cont lasix   - monitor bmp     2.      Anemia.  Rectal examination in the emergency room crystal

## 2017-02-01 NOTE — H&P
[facility]    PATIENT'S NAME: Arun Arzate   ATTENDING PHYSICIAN: More Reece MD   PATIENT ACCOUNT#:   [de-identified]    LOCATION:  [place] [address]  MEDICAL RECORD #:   [id number]       YOB: 1935  ADMISSION DATE:       01/31/20 that because of right arm injury and skin tear recently, he was taken off Coumadin and placed only on aspirin. ALLERGIES:  Adhesive tape. FAMILY HISTORY:  Both parents  of heart disease. SOCIAL HISTORY:  Remote tobacco use.   No alcohol or drug examination in the emergency room showed rectal vault to be empty of stool, and there is no trace heme positive blood. 3.   History of coronary artery disease and cardiomyopathy and possible acute on chronic heart failure.     PLAN:  The patient will be ad

## 2017-02-01 NOTE — CONSULTS
[facility] NOTE  Cardiology Consultation    Etta Baltazar Patient Status:  Emergency    1935 MRN [id number]   Location [address] Attending John Bello MD   Hosp Day # 0 PCP Glendy Fernandez     Reason of congestive heart failure     Anasarca          Assessment and Plan:  1 congestive heart failure with signs and symptoms of both right and left-sided failure but the right-sided failure is very severe and I suspect he may have severe tricuspid insufficie alert and comfortable in no acute distress  Head; atraumatic normocephalic  Conjunctiva; not injected nonicteric  Neck; no thyroidomegaly, jugular venous pulses normal carotid upstrokes are normal no bruits  Lungs; clear to percussion and auscultation  Car

## 2017-02-01 NOTE — DISCHARGE PLANNING
2/1CM-MD orders received in regards to discharge planning. The Patient was seen at bedside. The Patient resides with his wife  in [place] in a ranch home with 12 stairs to the basement and 2 steps to enter.    Prior to hospitalization, the Patient was on h

## 2017-02-02 NOTE — PROGRESS NOTES
[facility]    Cardiology Progress Note    Dorenda Divryland Patient Status:  Inpatient    1935 MRN [id number]   Location [facility] 3W/SW Attending Rj Chung, [address] [facility] Day # 1 PCP Glendy Fernandez     Patient Active Problem saccharomyces boulardii  1 packet Oral BID   • amiodarone HCl  200 mg Oral Nightly   • carvedilol  6.25 mg Oral BID with meals         Results:     Lab Results  Component Value Date   T4F 1.69 01/31/2017   TSH 5.71 01/31/2017   TROP 0.32 01/31/2017       R paced rhythm ABNORMAL When compared with ECG of 11/11/2010 19:04:38 No new changes Electronically signed on 02/01/2017 at 13:37 by Nicole Mitchell MD  2/1/2017

## 2017-02-02 NOTE — HOME CARE LIAISON
REFERRAL RECEIVED FROM [address], , FOR POSSIBLE HOME HEALTH UPON DISCHARGE, PENDING ORDERS. MET WITH PATIENT AND HIS WIFE TO [address]  BOTH STATED THEY WERE UNSURE IF THEY WANTED HOME HEALTH, ADDING THEY WOULD LIKE TO

## 2017-02-02 NOTE — PROGRESS NOTES
[facility]    Progress Note    Kedar Arroyo Patient Status:  Inpatient    1935 MRN [id number]   Location [facility] 3W/SW Attending José Luis Casanova, [address] Day # 2 PCP Destinee Joy       Subjective:   Kedar Arroyo i 02/02/2017   HGB 9.5* 02/02/2017   HCT 28.6* 02/02/2017    02/02/2017   CREATSERUM 1.65* 02/02/2017   BUN 28* 02/02/2017    02/02/2017   K 3.8 02/02/2017   CL 97 02/02/2017   CO2 34* 02/02/2017   GLU 88 02/02/2017   CA 8.6 02/02/2017   ALB 2.6 heart failure that is affecting the liver.  Rule out possible liver cirrhosis. - s/p 6900 out by paracenteis. Echo with EF 35%.  ,   - monitor bmp     2.      Anemia.  Rectal examination in the emergency room showed rectal vault to be empty of stool, and t

## 2017-02-02 NOTE — IMAGING NOTE
PT TO ULTRASOUND ROOM  SCANS COMPLETED BY CAROLINA KELLER TAKEN PROCEDURE EXPLAINED QUESTIONS ANSWERED. PT CONSENTED AT 0805    PT TO GET ALBUMIN 25% 25 GRAM  SOHAIL ALEXANDRE    HERE, SCANNING COMPLETED.    TIME OUT TAKEN AT [address] [place] [address] Drive

## 2017-02-02 NOTE — PROGRESS NOTES
Renay Carranza [facility]  GI SERVICE PROGRESS NOTE    Krishan Pereira Patient Status:  Inpatient    1935 MRN [id number]   Location [facility] 3W/SW Attending Jorge Luis Stevens, [address] [facility] Day # 2 PCP Sunny Perera       Subjective:     Tanya Lora 01/31/17   2323  02/01/17   0816  02/02/17   0609   MG  1.9   --    --    --   1.9  1.9   TROP  0.04*  0.07*  0.17*  0.32*   --    --        No results for input(s): URINE, CULTI, BLDSMR in the last 72 hours.       Xr Chest Ap Portable  (cpt=71010)    1/31/ afternoon January 31st for evaluation of anasarca and ascites, ?hypoxia.  He states that about 3 weeks ago his abdomen became increasingly distended to the point of tense and uncomfortable.  Very troubling lower extremity edema.     New concern for [facility] service.     Renal function worsening 2/2/17    Starting Inotrope, possibly more diuresis per Dr Jackie Jung service      Garrett Hurtado MD

## 2017-02-02 NOTE — PROCEDURES
[facility]  Procedure Note    Hermine Boas Patient Status:  Inpatient    1935 MRN [id number]   Location [facility] 3W/SW Attending Roya Lake, [address] [facility] Day # 2 PCP Sy Rosario     Procedure: US guided paracentesis

## 2017-02-02 NOTE — PROGRESS NOTES
[place] [facility]    Cardiology Progress Note    Lamar Arce Patient Status:  Inpatient    1935 MRN [id number]   Location [facility] 3W/SW Attending Anna Marcano, [address] [facility] Day # 2 PCP Nati Hernandez     Patient Active Problem a(n) [de-identified]year old male with no new c/o today    Objective:   Blood pressure 107/43, pulse 61, temperature 98.7 °F (37.1 °C), temperature source Oral, resp. rate 20, height 5' 6\" (1.676 m), weight 173 lb 14.4 oz (78.881 kg), SpO2 96 %.     Exam  Gen: No acut 01/31 0700 - 02/01 0659 02/01 0700 - 02/02 0659    P.O.  240 613    I.V. (mL/kg)  30 (0.4)     Blood  699     Total Intake(mL/kg)  969 (12) 613 (7.6)    Urine (mL/kg/hr)  1950 450 (0.5)    Total Output   1950 450    Net   -981 +163           Void Urine Occ

## 2017-02-03 NOTE — PROGRESS NOTES
[facility]    Progress Note    Paulie Doss Patient Status:  Inpatient    1935 MRN [id number]   Location [facility] 3W/SW Attending Patricia Pérez, [address] [facility] Day # 3 PCP Olamide Ziegler       Subjective:   Paulie Doss i Oral BID   amiodarone HCl (PACERONE) tab 200 mg 200 mg Oral Nightly         Lab Results  Component Value Date   WBC 6.8 02/02/2017   HGB 9.5* 02/02/2017   HCT 28.6* 02/02/2017    02/02/2017   CREATSERUM 1.56* 02/03/2017   BUN 26* 02/03/2017    consulted - apprec input   - monitor cbc     3.      History of coronary artery disease and cardiomyopathy and possible acute on chronic heart failure. - apprec cards consult. Echo as above. Possible cath on Mon     4.  Hypothyroid - TSH elevated , T 4 girish

## 2017-02-03 NOTE — PROGRESS NOTES
[facility] Heart Cardiology  Progress Note    Paulie Selam Patient Status:  Inpatient    1935 MRN [id number]   Location [facility] 3W/SW Attending Patricia Pérez, [address] [facility] Day # 3 PCP AYANA Castillo Value Date   WBC 6.8 02/02/2017   HGB 9.5* 02/02/2017   HCT 28.6* 02/02/2017    02/02/2017   CREATSERUM 1.56* 02/03/2017   BUN 26* 02/03/2017    02/03/2017   K 3.3 02/03/2017   CL 92* 02/03/2017   CO2 37* 02/03/2017   * 02/03/2017   CA

## 2017-02-04 NOTE — PROGRESS NOTES
[facility]    Progress Note    Yamilet Herron Patient Status:  Inpatient    1935 MRN [id number]   Location [facility] 3W/SW Attending Aime Garcia, [address] [facility] Day # 4 PCP William Travis       Subjective:   Yamilet Herron i saccharomyces boulardii (FLORASTOR KIDS) 250 MG oral powder packet 1 packet 1 packet Oral BID   amiodarone HCl (PACERONE) tab 200 mg 200 mg Oral Nightly         Lab Results  Component Value Date   WBC 6.7 02/04/2017   HGB 9.9* 02/04/2017   HCT 29.8* 02/0 artery disease and cardiomyopathy and possible acute on chronic heart failure. - apprec cards consult. Echo as above. Possible cath on Mon     4. Hypothyroid - TSH elevated , T 4 elevated - likely due to amiodarone.      5. Acute renal failure - Possible c

## 2017-02-04 NOTE — PROGRESS NOTES
[facility]  GI SERVICE PROGRESS NOTE    Paulie Doss Patient Status:  Inpatient    1935 MRN [id number]   Location [facility] 3W/SW Attending Patricia Pérez, [address] [facility] Day # 4 PCP AYANA AHMADI       Subjective:     No com along with his wife is well-known to me for workup of abdominal symptoms including change in bowel patterns constipation and workup of abdominal ascites since June 2016.  As per today's Cardiology consultation, he suffers a complex history of worsening hea January 31, 2017 show AST 35 ALT 14 alkaline phosphatase 67 total bilirubin 0.8 albumin 2.5  CBC this time shows us hemoglobin 7.4 g, .7 with mixed results on iron studies.     TSH 5.71 / Free T4 1.69    s/p 6900mL therapeutic paracentesis 2/2/17am

## 2017-02-05 NOTE — PROGRESS NOTES
[place] [facility] - [facility]    Progress Note    Kedar Arroyo Patient Status:  Inpatient    1935 MRN [id number]   Location [facility] 3W/SW Attending José Luis Casanova, [address] Day # 5 PCP Destinee Joy       Subjective:   Kedar Arroyo i packet 1 packet 1 packet Oral BID   amiodarone HCl (PACERONE) tab 200 mg 200 mg Oral Nightly         Lab Results  Component Value Date   WBC 8.1 02/05/2017   HGB 11.1* 02/05/2017   HCT 33.0* 02/05/2017    02/05/2017   CREATSERUM 1.60* 02/05/2017   B units prbcs   - GI consulted - apprec input   - monitor cbc     3.      History of coronary artery disease and cardiomyopathy and possible acute on chronic heart failure. - apprec cards consult. Echo as above. Possible cath on Mon     4.  Hypothyroid - TSH

## 2017-02-05 NOTE — PROGRESS NOTES
[facility]    Progress Note    Lula Calderon Patient Status:  Inpatient    1935 MRN [id number]   Location [facility] 3W/SW Attending Tom Perez, [address] Day # 5 PCP Jose Carlos Rust       Subjective:   Lula Calderon i deficit. Skin: Warm and dry. Neck:+ JVD, carotids 2+, no bruits. Cardiac: RRR. S1, S2 normal. No murmur   Telemetry -  NSR, rates 70-80's  Lungs: clear  Normal excursions and effort. Abdomen: Soft, non-tender. No mass or rebound, BS-present x 4.   Ext

## 2017-02-06 NOTE — PROGRESS NOTES
Core measure update: Currently no B-Blocker secondary to acute HF and need for ionotropic support. Currently no ACE/ARB secondary to renal function and pending cath.  Will reevaluate if clnically apprpriate

## 2017-02-06 NOTE — PROGRESS NOTES
[place] [facility] - [facility]    Progress Note    Juliocesar Elsinore Patient Status:  Inpatient    1935 MRN [id number]   Location [facility] 3W/SW Attending Minna Nix, [address] [facility] Day # 6 PCP Soraya Magana       Subjective:   Juliocesar Grace i breakfast   saccharomyces boulardii (FLORASTOR KIDS) 250 MG oral powder packet 1 packet 1 packet Oral BID   amiodarone HCl (PACERONE) tab 200 mg 200 mg Oral Nightly         Lab Results  Component Value Date   WBC 8.1 02/05/2017   HGB 11.1* 02/05/2017   HCT is no trace heme positive blood. - s/p 2 units prbcs   - GI consulted - apprec input   - monitor cbc     3.      History of coronary artery disease and cardiomyopathy and possible acute on chronic heart failure. - apprec cards consult. Echo as above.  Rig

## 2017-02-06 NOTE — PROGRESS NOTES
[place] [facility] - [facility]    Cardiology Progress Note    Juliocesar Grace Patient Status:  Inpatient    1935 MRN [id number]   Location [facility] 3W/SW Attending Minna Nix, [address] [facility] [address] [facility] [address] Day # 6 PCP Soraya Magana     Patient Active Problem Oral Nightly         Results:        Recent Labs   Lab  02/04/17   0736  02/05/17   0827   WBC  6.7  8.1   HGB  9.9*  11.1*   MCV  102.1*  102.2*   PLT  204  223       Recent Labs   Lab  02/03/17   1757  02/04/17   0736  02/05/17   0029  02/05/17   0827

## 2017-02-07 NOTE — DIETARY NOTE
ADULT NUTRITION INITIAL ASSESSMENT    Pt is at moderate nutrition risk. Pt does not meet malnutrition criteria.       RECOMMENDATIONS TO MD:  Recommendations to MD: RD to manage oral nutritional supplementation     NUTRITION DIAGNOSIS/PROBLEM:  Inadequate Body Wt: 160 lbs  Per wife     80% UBW    WEIGHT HISTORY:  Patient Weight(s) for the past 336 hrs:   Weight   02/07/17 0500 58.06 kg (128 lb)   02/06/17 0500 59.104 kg (130 lb 4.8 oz)   02/05/17 0500 61.326 kg (135 lb 3.2 oz)   02/04/17 0513 63.73 kg (140

## 2017-02-07 NOTE — PROGRESS NOTES
[facility]    Progress Note    Nat Matthews Patient Status:  Inpatient    1935 MRN [id number]   Location [facility] 3W/SW Attending Gulshan Gu, [address] [facility] [address] Day # 7 PCP AYANA AHMADI       Subjective:   Nat Matthews i (FLORASTOR KIDS) 250 MG oral powder packet 1 packet 1 packet Oral BID   amiodarone HCl (PACERONE) tab 200 mg 200 mg Oral Nightly         Lab Results  Component Value Date   WBC 9.4 02/07/2017   HGB 11.0* 02/07/2017   HCT 32.6* 02/07/2017    02/07/20 input   - monitor cbc     3.      History of coronary artery disease and cardiomyopathy and possible acute on chronic heart failure. - apprec cards consult. Echo as above. Right and Left heart cath tomorrow     4.  Hypothyroid - TSH elevated , T 4 elevated

## 2017-02-07 NOTE — PROCEDURES
RHC:    PA 36/8  PCW 10  RV 41/6  RA mean 7    AO 85%  PA 45.5%  CO 3.5; CI 2.1      Has reached dry weight d/c Bumex drip now  Low CO advanced CHF NYHA Class IV  Wean of dobutamine as tolerated  Start afterload reduction with hydralazine TID  Consult Dr Yajaira Marinelli

## 2017-02-08 NOTE — HOME CARE LIAISON
MET WITH PATIENT AND WIFE ON 2/2/17, TO DISCUSS POSSIBLE HOME HEALTH FOLLOW-UP AND BOTH UNDECIDED RE: FOLLOW-UP.  CONTACTED [address] [facility] [address] PENDING PHYSICIAN ORDERS AND WIFE STATED BOTH ARE NOW IN [place] [address]

## 2017-02-08 NOTE — PROGRESS NOTES
[facility]    Cardiology Progress Note    Dorenda Divine Patient Status:  Inpatient    1935 MRN [id number]   Location [facility] 3W/SW Attending Sylvain Heard Basilia Day # 8 PCP Glendy Fernandez     Patient Active Pr lesions      Scheduled Meds:   • carvedilol  3.125 mg Oral BID with meals   • spironolactone  12.5 mg Oral Daily   • Heparin Sodium (Porcine)  5,000 Units Subcutaneous Q8H [facility]   • nystatin   Topical BID   • sodium chloride   Intravenous Once   • aspirin EC

## 2017-02-08 NOTE — PROGRESS NOTES
[facility]    Progress Note    Lula Calderon Patient Status:  Inpatient    1935 MRN [id number]   Location [facility] 3W/SW Attending Sylvain Heard Day # 8 PCP AYANA AHMADI     Subjective:     Respirator Left heart cath tomorrow     4. Hypothyroid - TSH elevated , T 4 elevated - likely due to amiodarone. 5. Acute renal failure - Possible cardiorenal. Creat worsening needs improved co; will check bladder scan    6. DVT ppx     7.  Near fall needs

## 2017-02-09 NOTE — PROGRESS NOTES
[facility]    Cardiology Progress Note    Charlene Mendoza Patient Status:  Inpatient    1935 MRN [id number]   Location [facility] 3W/SW Attending Angela Cardona   Hosp Day # 9 PCP AYANA AHMADI     2017  Noreen Ross 3.6  4.0  3.6   CL  85*  84*  89*   CO2  36*  36*  39*   BUN  36*  49*  60*   CREATSERUM  1.78*  1.93*  1.97*   CA  8.6  8.8  8.7   MG   --    --   2.3   GLU  97  138*  103*       No results for input(s): ALT, AST, ALB, AMYLASE, LIPASE, LDH in the last 72 cardiomyopathy ejection fraction about 35-40%    Severe tricuspid insufficiency and PA pressures about 58.  And with recurrent ascites now ahs reached dry weight based on RHC findings Cr going up now.  Mean right atrial pressure of 7 a PA pressure of 36 ove

## 2017-02-09 NOTE — PROGRESS NOTES
[place] [facility] - [facility]    Progress Note    Keily Batista Patient Status:  Inpatient    1935 MRN [id number]   Location [facility] 3W/SW Attending Sylvain Heard Day # 9 PCP AYANA AHMADI     Subjective:     Constituti disease and cardiomyopathy and possible acute on chronic heart failure. - apprec cards consult. Echo as above. Await guidance card about meds    4. Hypothyroid - TSH elevated , T 4 elevated - likely due to amiodarone.      5. Acute renal failure - Possible

## 2017-02-09 NOTE — PHYSICAL THERAPY NOTE
PHYSICAL THERAPY EVALUATION - INPATIENT     Room Number: 344/344-A  Evaluation Date: 2/9/2017  Type of Evaluation: Initial  Physician Order: PT Eval and Treat    Presenting Problem: anascara ascites  LE swelling   s/p paracentesis  ARF  acute on chroni episode with RN staff yesterday     Patient self-stated goal is d/c to home     OBJECTIVE  Precautions: Cardiac  Fall Risk: High fall risk    WEIGHT BEARING RESTRICTION                   PAIN ASSESSMENT  Ratin          COGNITION  · Grossly WFL  AOC x 3 assistance (poor tolerance  very weakn)  Distance (ft): 2-3 ft   Assistive Device: Rolling walker        Comment : pt with flexed posture  heavy reliance on RW  decrease step length    Skilled Therapy Provided: chart reviewed   RN approve participation  Pt setting;24 hour care/supervision;Sub-acute rehabilitation;Home with home health PT/OT    PLAN  PT Treatment Plan: Bed mobility; Energy conservation;Patient education;Range of motion;Transfer training;Balance training;Stair training  Rehab Potential : Fair

## 2017-02-10 NOTE — DISCHARGE PLANNING
Met with patient at bedside to deliver IM. Patient states he lives at home with his wife in 1 story house. Patient states he does not go down to the basement.  CTL reviewed discharge plan and recommendation for rehab, patient states he does not want to go t

## 2017-02-10 NOTE — PROGRESS NOTES
[facility] Heart Cardiology Progress Note      Keily Batista Patient Status:  Inpatient    1935 MRN [id number]   Location [facility] 3W/SW Attending Sylvain Heard Day # 10 ELISABETH Woods x3,   Neck:supple,no JVD  Pulm: Bilat rhonchi, normal respiratory effort  CV: Heart with regular rate and rhythm, Nl S1,S2 ,no S3, crisp prosthetic valve sounds  Abd: Abdomen soft, nontender, nondistended, no organomegaly, bowel sounds present  Ext:  no cl

## 2017-02-10 NOTE — PROGRESS NOTES
Core Measure Update: EF 35-40%  Currently on long acting beta blocker. Currently no ACE/ARB or spironolactone secondary to renal function.

## 2017-02-10 NOTE — DISCHARGE PLANNING
2/10/17 CM Discharge planning   Pt plans to return home at discharge, agreed to home RN and PT from [place] [id number]. A referral has been made to [facility] [place] [id number] order in chart.    Tamika Hill X [phone]

## 2017-02-10 NOTE — PROGRESS NOTES
[place] [facility]    Progress Note    Shine Sanchez Patient Status:  Inpatient    1935 MRN [id number]   Location [facility] 3W/SW Attending Sylvain Heard Day # 10 PCP AYANA AHMADI     Subjective:     Respirato guidance card about meds    4. Hypothyroid - TSH elevated , T 4 elevated - likely due to amiodarone. 5. Acute renal failure - Possible cardiorenal. Creat worsening needs improved co    6. DVT ppx     7.  Near fall needs pt; poss hypotension but d

## 2017-02-11 NOTE — PROGRESS NOTES
[facility] Heart Cardiology Progress Note      Alison Peña Patient Status:  Inpatient    1935 MRN [id number]   Location [facility] 3W/SW Attending Sylvain Heard [place] Day # 11 PCP Pasquale Bravo (mL/kg/hr) 810 (0.6) 1400 (1)     Stool 0 (0) 0 (0)     Total Output 810 1400      Net -274.1 -860             Void Urine Occurrence 2 x 1 x     Stool Occurrence 0 x 1 x              Exam  Gen: No acute distress, alert and oriented x3,   Neck:supple,no JVD

## 2017-02-11 NOTE — PLAN OF CARE
Ambulated patient 100 feet in hallway. Pt tolerated well. Lowest HR was 57, Highest HR was 61    Lowest O2 sat was 90%, Highest O2 sat was 93%    Encouraged pt to increase activity as tolerated.

## 2017-02-11 NOTE — DISCHARGE PLANNING
SW was informed by RN that pt is anticipated to d/c today after RN walks w/ pt. SW informed [facility]liv[facility] of anticipated discharge.     Jalen Wick, [address]

## 2017-02-14 NOTE — DISCHARGE SUMMARY
[facility]    PATIENT'S NAME: Moody Tucker   ATTENDING PHYSICIAN: Lázaro Heard MD   PATIENT ACCOUNT#:   [id number]    LOCATION:  [address] [place] [address] #:   [id number]       YOB: 1935  ADMISSION DATE:       01/31/2 Anemia, possibly gastrointestinal blood loss. Perhaps workup when patient is more stable. 3.   Ischemic cardiomyopathy. Cardiology as per med. 4.   Hypothyroidism.   5.   Acute renal failure with cardiorenal syndrome, now with chronic kidney disease, s

## 2017-02-15 NOTE — PATIENT INSTRUCTIONS
Continue current medications    Begin tracking daily weights. Call with weight gain of 2-3 lbs overnight or concerning symptoms.  [phone]    32-64 oz fluid restriction    Less than 2000 mg sodium/salt diet    Scheduled follow up appointment with Dr Alberto Akbar

## 2017-02-15 NOTE — PROGRESS NOTES
[facility]        José Luis Farmer is a [de-identified]year old male who presents to clinic for management of acute on chronic systolic heart failure and generalized anasarca.      The patient came into the emergency department on 1/31/17 for evaluation adelaida 01/31/2017 08:55 PM   TP 7.9 01/31/2017 08:55 PM   AST 37 01/31/2017 08:55 PM   ALT 15* 01/31/2017 08:55 PM   PTT 28.8 02/07/2017 06:51 AM   INR 1.3* 02/07/2017 06:51 AM   PTP 15.9* 02/07/2017 06:51 AM   T4F 1.69* 01/31/2017 08:55 PM   TSH 5.71* 01/31/2017 and medications with patient and family. Receptive.      Assessment:  Acute on chronic systolic heart failure  - Discharged off long time Bumex 2 mg daily  - Coreg 3.125  - No ace/arb/spironolactone in the hospital due to renal function    Decision Making:

## 2017-02-20 NOTE — PATIENT INSTRUCTIONS
Please start taking bumex 1 mg (half pill) daily. Call on Thursday with update on weight and if you are experiencing dizziness or lightheadedness     Continue tracking daily weights. Call with weight gain of 3 lbs overnight or concerning symptoms.  [phone]

## 2017-02-20 NOTE — PROGRESS NOTES
[address] [place] [address]        Marcy Thompson is a [de-identified]year old male who presents to clinic for management of acute on chronic systolic heart failure and generalized anasarca.      The patient came into the emergency department on 1/31/17 for evaluation adelaida 01/31/2017 08:55 PM   BILT 0.7 01/31/2017 08:55 PM   TP 7.9 01/31/2017 08:55 PM   AST 37 01/31/2017 08:55 PM   ALT 15* 01/31/2017 08:55 PM   PTT 28.8 02/07/2017 06:51 AM   INR 1.3* 02/07/2017 06:51 AM   PTP 15.9* 02/07/2017 06:51 AM   T4F 1.69* 01/31/2017 sodium/fluid restriction, and medications with patient and family. Receptive.      Assessment:  Acute on chronic systolic heart failure  - Discharged off long time Bumex 2 mg daily  - Coreg 3.125  - No ace/arb/spironolactone in the hospital due to renal fun

## 2017-02-28 ENCOUNTER — PRIOR ORIGINAL RECORDS (OUTPATIENT)
Dept: OTHER | Age: 82
End: 2017-02-28

## 2017-02-28 NOTE — PROGRESS NOTES
[address]        Eugenie Dalton is a [de-identified]year old male who presents to clinic for management of acute on chronic systolic heart failure and generalized anasarca.      The patient came into the emergency department on 1/31/17 for evaluation bec 01/31/2017 08:55 PM   BILT 0.7 01/31/2017 08:55 PM   TP 7.9 01/31/2017 08:55 PM   AST 37 01/31/2017 08:55 PM   ALT 15* 01/31/2017 08:55 PM   PTT 28.8 02/07/2017 06:51 AM   INR 1.3* 02/07/2017 06:51 AM   PTP 15.9* 02/07/2017 06:51 AM   T4F 1.69* 01/31/2017 and medications with patient and family. Receptive.      Assessment:  Acute on chronic systolic heart failure  - Discharged off long time Bumex 2 mg daily  - Coreg 3.125  - No ace/arb/spironolactone in the hospital due to renal function    Decision Making:

## 2017-02-28 NOTE — PATIENT INSTRUCTIONS
Please start taking Bumex 2 mg (full pill) on Monday, Wednesday, and Friday. On Tuesday, Thursday, Saturday and Sunday, please take 1 mg (1/2 pill)     Continue tracking daily weights. Call with weight gain of 3 lbs overnight or concerning symptoms.  [phone]

## 2017-03-01 LAB
BUN: 45 MG/DL
CALCIUM: 8.7 MG/DL
CHLORIDE: 97 MEQ/L
CREATININE, SERUM: 1.32 MG/DL
GLUCOSE: 132 MG/DL
HEMATOCRIT: 30.6 %
HEMOGLOBIN: 10 G/DL
PLATELETS: 305 K/UL
POTASSIUM, SERUM: 4.2 MEQ/L
RED BLOOD COUNT: 2.94 X 10-6/U
SODIUM: 137 MEQ/L
WHITE BLOOD COUNT: 6.9 X 10-3/U

## 2017-03-08 ENCOUNTER — PRIOR ORIGINAL RECORDS (OUTPATIENT)
Dept: OTHER | Age: 82
End: 2017-03-08

## 2017-03-22 PROBLEM — R19.5 HEME POSITIVE STOOL: Status: ACTIVE | Noted: 2017-01-01
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
## 2017-03-22 NOTE — ED INITIAL ASSESSMENT (HPI)
Pt sent to ED from CHF clinic for hgb 6.1. Pt also reports he had a fall earlier this morning while SOB. Denies hitting head or LOC. Pt states he lowered himself to the ground and landed on his right hip.

## 2017-03-22 NOTE — CONSULTS
Cardiology (Consult dictated)    Assessment:   1. Severe anemia. 2. Ischemic cardiomyopathy    3. Chronic right heart failure. Weight up ~ 7 lbs over past 7-10 days. Rec:    Transfuse to hemoglobin of 8  Continue home meds, hold aspirin for now.

## 2017-03-22 NOTE — PROGRESS NOTES
[address] [facility]        Fredrick Mcmillan is a [de-identified]year old male who presents to clinic for management of acute on chronic systolic heart failure and generalized anasarca.      The patient came into the emergency department on 1/31/17 for evaluation bec TP 7.9 01/31/2017 08:55 PM   AST 37 01/31/2017 08:55 PM   ALT 15* 01/31/2017 08:55 PM   PTT 28.8 02/07/2017 06:51 AM   INR 1.3* 02/07/2017 06:51 AM   PTP 15.9* 02/07/2017 06:51 AM   T4F 1.69* 01/31/2017 08:55 PM   TSH 5.71* 01/31/2017 08:55 PM   LIP 29 0 Assessment:  Acute on chronic systolic heart failure  - Discharged off long time Bumex 2 mg daily --> bumex 2 mg M/W/F, 1 mg Tu/Thu/Sa/Uriarte  - Coreg 3.125  - No ace/arb/spironolactone in the hospital due to renal function    Decision Making: Since last c

## 2017-03-22 NOTE — HISTORICAL OFFICE NOTE
Therese Roberto  : 1935  ACCOUNT:  [id number]  [phone]  PCP:  YEFRI DATE: 2017  DICTATED BY:  Nelson Mahajan M.D., [facility]]    CHIEF COMPLAINT: [Followup of . CAD, of native vessels, Followup of .  CHF and left ventricular 35% cath .]    H lesions. MS: no limiting arthritis. NEURO: no localized deficits. HEM/LYMPH: denies easy bruising. ALL: no new food or environmental allergies.     PAST HISTORY: hypothyroidism, carotid artery disease, diabetes, anemia, diabetes, Anasarca,, Colon polyp, ski heart failure hospitalization in January of this year and maintains in [facility] 39 class 3 on continuous home oxygen.   He does have some evidence of rales and a productive cough today, and we will increase his Bumex and obtain a chest x-ray 400MG     1 tab daily  17 Tylenol               325MG     1 tab every  4-6 hours as needed      Dani Aguirre M.D., [facility]    SHELLI/roge - DD: 2017 - DT: 2017 - Job ID: [id number]     ECHOCARDIOGRAM    Patient: Franny Goddard         : 19 insufficiency. Mitral inflow reveals a mean gradient of 8 mm Hg. The mitral valve area blood pressure at time was 2.3 cm2, this is consistent with mild to moderate mitral stenosis. There is no mitral regurgitation or periprosthetic leak.  Tricuspid inflow w

## 2017-03-22 NOTE — PROGRESS NOTES
Patient received from ER per cart. Blood infusing. VSS. O2 at 3 L NC. Patient is alert, oriented, no pain or SOB. Belongings at bedside. Family at bedside.  Monitoring

## 2017-03-22 NOTE — ED PROVIDER NOTES
Patient Seen in: [facility] AND [facility] Emergency Department    History   Patient presents with:  Fall (musculoskeletal, neurologic)    Stated Complaint: fall    HPI    16-year-old male with history of hypertension, diabetes, hyperlipidemia, congestive heart times daily. History reviewed. No pertinent family history.       Smoking Status: Former Smoker                   Packs/Day: 0.00  Years:           Types: Cigarettes    Alcohol Use: Yes           1.2 oz/week       2 Standard drinks or equivalent per w DIAGNOSIS: After history and physical exam differential diagnosis was considered for anemia, gastrointestinal bleed, head injury?         ED Course     Labs Reviewed   CBC W/ DIFFERENTIAL - Abnormal; Notable for the following:     RBC 1.62 (*)     HGB 5.7 ( hospitalist.  Also discussed with Dr. Bang Garcia, gastroenterology. Also discussed with cardiology.     I spent a total of 30 minutes of critical care time in obtaining history, performing a physical exam, bedside monitoring of interventions, collecting and in

## 2017-03-22 NOTE — H&P
[facility]    PATIENT'S NAME: Tim Cuencakaren   ATTENDING PHYSICIAN: Nathaniel Martines MD   PATIENT ACCOUNT#:   [id number]    LOCATION:  [address] [place] [address] [place] #:   [id number]       YOB: 1935  ADMISSION DATE:       03/22/20 please see medication reconciliation list.  He was taken off Coumadin and he is only on aspirin at this point. ALLERGIES:  Adhesive tape. SOCIAL HISTORY:  Remote tobacco use. No alcohol or drug use. Lives with his wife.   Usually independent in his

## 2017-03-22 NOTE — CONSULTS
[facility]  Report of GI Consultation    Melissa Trujillo Patient Status:  Emergency    1935 MRN [id number]   Location [address] Attending William Chen MD   Hosp Day # 0 PCP Emily Rachel     Date of reported fall this morning and shortness of breath. Over 6 weeks after last transfusions, found to have progression of previous anemia on labs which were repeated. Rectal exam in the emergency room was positive for occult blood.     BNP was 1165 this admi resp. rate 20, height 5' 6\" (1.676 m), weight 148 lb (67.132 kg), SpO2 98 %. GENERAL: Pale, lying in bed. Good spirits.   Wife at bedside  HEENT: Normocephalic; pupils equally round and reactive to light; ++ temporal wasting  PULM: Bibasilar Rales on a 16-year-old gentleman on home oxygen therapy apparently New York Heart Association class III; biventricular dysfunction with moderate to severe TR/RV function; undergoing intense monitoring and therapy for CHF here with the CHF clinic; under workup for

## 2017-03-23 NOTE — PROGRESS NOTES
PT TRANSFERRED TO  332 AT THIS TIME IN STABLE CONDITION. VSS. FIRST UNIT OF BLOOD COMPLETED AT 2034, IV SALINE LOCKED - TO RECEIVE SECOND UNIT W/ NEW PRIMARY RN. PT SENT ON 3L O2 VIA NC & TELE BOX. ALL PERSONAL BELONGINGS SENT W/ PT. WIFE AT BEDSIDE.  SBA

## 2017-03-23 NOTE — PROGRESS NOTES
[facility]  GI SERVICE PROGRESS NOTE    Fredrick Mcmillan Patient Status:  Inpatient    1935 MRN [id number]   Location [facility] 3W/SW Attending Sylvain Heard Day # 1 PCP Yo Bazan       Subjective: No acute intracranial finding. 2. 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 3. Changes of chronic small vessel disease in cerebral white matter-not unexpected for age. 4. 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  5. Intracranial atherosclerosis cavernous c gastropathy. 3/22: Transfused 2 units RBCs  3/23: Tolerated transfusions. Undergoing diuresis as per Cardiology. Recommendations:  · Transfuse and diuresis as per cardiology recommendations. Dr. Nava Diego note reviewed today.   · Intravenous iron blount

## 2017-03-23 NOTE — PLAN OF CARE
Patient Centered Care    • Patient feels supported Progressing        Patient/Family Goals    • Patient/Family Long Term Goal Progressing    • Patient/Family Short Term Goal Progressing        Patient admitted with anemia.  Patient had 2 units of PRBCs mcgee

## 2017-03-23 NOTE — PROGRESS NOTES
[facility]    Cardiology Progress Note    Eugenie Dalton Patient Status:  Inpatient    1935 MRN [id number]   Location [facility] 3W/SW Attending Bekah Butt MD   [address] Day # 1 PCP AYANA AHMADI     3/22/2017  Subjective Rash    Medications:    Current Facility-Administered Medications:  Potassium Chloride ER (K-DUR M20) CR tab 40 mEq 40 mEq Oral Q4H   acetaminophen (TYLENOL) tab 650 mg 650 mg Oral Q6H PRN   ondansetron HCl (ZOFRAN) injection 4 mg 4 mg Intravenous Q6H PRN

## 2017-03-23 NOTE — CONSULTS
[facility]    PATIENT'S NAME: Bev Savannah   ATTENDING PHYSICIAN: Kaiser Epps MD   CONSULTING PHYSICIAN: Yasmine Arias.  Migdalia Hewitt MD   PATIENT ACCOUNT#:   [id number]    LOCATION:  [address] [place] [address] #:   [id number]       DATE OF BIRTH: Flonase inhaled twice a day, Synthroid 0.112 q.a.m., psyllium, and Tylenol. ALLERGIES:  None known. SOCIAL HISTORY:  Never smoked. One cup of coffee a day. Rare alcohol. . Wife at the bedside. He is a retired rodriguez.      REVIEW OF SYST

## 2017-03-23 NOTE — PROGRESS NOTES
[place] [facility]    Progress Note    Johanna Erick Patient Status:  Inpatient    1935 MRN [id number]   Location [facility] 3W/SW Attending Sylvain Heard Day # 1 PCP AYANA AHMADI     Subjective:     Constitu 03/23/2017   AST 36 03/23/2017   ALT 18 03/23/2017   PTT 28.8 02/07/2017   INR 1.3* 02/07/2017   PT 17.4* 09/26/2016   T4F 1.69* 01/31/2017   TSH 5.71* 01/31/2017   LIP 29 01/31/2017   MG 2.4 02/13/2017   TROP 0.32* 01/31/2017       Ct Brain Or Head ([id number]

## 2017-03-24 NOTE — DISCHARGE PLANNING
3/24/17 CM Discharge planning   Pt resides with wife, family home and is current with [facility]. Resume HHC orders in chart, [facility] advised orders on chart and will resume [facility] services at discharge.    [facility]  X [phone]

## 2017-03-24 NOTE — PROGRESS NOTES
[facility]  GI SERVICE PROGRESS NOTE    Brooke Benita Patient Status:  Inpatient    1935 MRN [id number]   Location [facility] 3W/SW Attending Sylvain Heard Day # 2 PCP Glenyd Fernandez       Subjective: No results for input(s): MAGNO, LIP in the last 72 hours. Recent Labs   Lab  03/24/17   0612   MG  2.1       No results for input(s): URINE, CULTI, BLDSMR in the last 72 hours.               Assessment and Plan:     46-year-old gentleman on home oxyg volume paracentesis with the fluid challenge of the transfusions          Katina Herrera MD

## 2017-03-24 NOTE — PROGRESS NOTES
Core Measure Update: EF 35%  Currently on long acting beta blocker. Please consider ace-inhibitor and spironolactone if clinically appropriate.

## 2017-03-24 NOTE — PROGRESS NOTES
[facility]    Progress Note    Markel Amaya Patient Status:  Inpatient    1935 MRN [id number]   Location [facility] 3W/SW Attending Sylvain Heard Day # 2 PCP AYANA AHMADI     Subjective:     HENT: Po BUN 29* 03/24/2017    03/24/2017   K 4.5 03/24/2017    03/24/2017   CO2 31 03/24/2017   GLU 83 03/24/2017   CA 8.4* 03/24/2017   ALB 2.2* 03/23/2017   ALKPHO 67 03/23/2017   BILT 2.4* 03/23/2017   TP 6.7 03/23/2017   AST 36 03/23/2017   ALT 1

## 2017-03-24 NOTE — PROGRESS NOTES
[place] [facility]    Progress Note    Rhondaelise Ericksonkamryn Patient Status:  Inpatient    1935 MRN [id number]   Location [facility] 3W/SW Attending Sylvain Heard Day # 2 PCP AYANA AHMADI         Assessment and Plan: 4.5 03/24/2017    03/24/2017   CO2 31 03/24/2017   GLU 83 03/24/2017   CA 8.4* 03/24/2017   ALB 2.2* 03/23/2017   ALKPHO 67 03/23/2017   BILT 2.4* 03/23/2017   TP 6.7 03/23/2017   AST 36 03/23/2017   ALT 18 03/23/2017   PTT 28.8 02/07/2017   INR 1.3*

## 2017-03-25 NOTE — ANESTHESIA POSTPROCEDURE EVALUATION
Patient: [place]    Procedure Summary     Date Anesthesia Start Anesthesia Stop Room / Location    03/25/17 1310 1332 [facility] ENDOSCOPY 01 / 300 [facility] ENDOSCOPY       Procedure Diagnosis Surgeon Responsible Provider    ESOPHAGOGASTRODUODENOSCOPY (EGD) (N/A

## 2017-03-25 NOTE — PHYSICAL THERAPY NOTE
Pt currently out of the room for testing, will follow up at the end of the day if schedule allows or 3/26-27

## 2017-03-25 NOTE — PLAN OF CARE
Problem: GASTROINTESTINAL - ADULT  Goal: Minimal or absence of nausea and vomiting  INTERVENTIONS:  - Maintain adequate hydration with IV or PO as ordered and tolerated  - Nasogastric tube to low intermittent suction as ordered  - Evaluate effectiveness of output  - Evaluate effectiveness of vasoactive medications to optimize hemodynamic stability  - Monitor arterial and/or venous puncture sites for bleeding and/or hematoma  - Assess quality of pulses, skin color and temperature  - Assess for signs of decrea

## 2017-03-25 NOTE — PROGRESS NOTES
[facility]    Progress Note    Angie Mulligan Patient Status:  Inpatient    1935 MRN [id number]   Location [facility] 3W/SW Attending Sylvain Heard Day # 3 PCP AYANA AHMADI     Subjective:     HENT: Ne 03/25/2017   GLU 89 03/25/2017   CA 8.5 03/25/2017   ALB 2.2* 03/23/2017   ALKPHO 67 03/23/2017   BILT 2.4* 03/23/2017   TP 6.7 03/23/2017   AST 36 03/23/2017   ALT 18 03/23/2017   PTT 28.8 02/07/2017   INR 1.3* 02/07/2017   PT 17.4* 09/26/2016   T4F 1.69*

## 2017-03-25 NOTE — ANESTHESIA PREPROCEDURE EVALUATION
Anesthesia PreOp Note    HPI:     Sienna Purcell is a [de-identified]year old male who presents for preoperative consultation requested by: Eliel Trivedi MD    Date of Surgery: 3/22/2017 - 3/25/2017    Procedure(s):  ESOPHAGOGASTRODUODENOSCOPY (EGD)  In time   Levothyroxine Sodium 112 MCG Oral Tab Take 112 mcg by mouth before breakfast. Disp:  Rfl:  3/21/2017 at Unknown time   Saccharomyces boulardii 250 MG Oral Powd Pack Take 1 mg by mouth 2 (two) times daily.  Disp:  Rfl:  3/21/2017 at Unknown time 1.2 oz/week    2 Standard drinks or equivalent per week         Comment: on occasion    Drug Use: No    Sexual Activity: Not on file   Not on file  Other Topics Concern   None on file     Social History Narrative       Available pre-op labs reviewed.     L anesthetic management as planned.   HERLINDA Mireles  3/25/2017 12:58 PM

## 2017-03-25 NOTE — TELEPHONE ENCOUNTER
GI RNs–  Please call the Medhat Rosales family on Monday to advise that he come in for CBC blood test every 2 weeks. Standing order put in today.

## 2017-03-25 NOTE — OPERATIVE REPORT
EGD PROCEDURE REPORT    DATE OF PROCEDURE:  3/25/2017    PCP: Donta Araujo     PREOPERATIVE DIAGNOSIS: Anemia, positive fecal occult blood result, melenic stool     POSTOPERATIVE DIAGNOSIS:  See impression. SURGEON:  SHANTELL Conway present. No congestion seen. No AVM lesions.     RECOMMENDATIONS:    · Continue supportive care  · Above benign submucosal mass has been present on CT scans going back to 2006  · Transfusion as needed; continue to follow CBC as outpatient  · No further wo

## 2017-03-25 NOTE — PROGRESS NOTES
[place] [facility]    Progress Note    Niki Billing Patient Status:  Inpatient    1935 MRN [id number]   Location [facility] 3W/SW Attending Sylvain Heard Day # 3 PCP AYANA AHMADI         Assessment and Plan: 2.4* 03/23/2017   TP 6.7 03/23/2017   AST 36 03/23/2017   ALT 18 03/23/2017   PTT 28.8 02/07/2017   INR 1.3* 02/07/2017   PT 17.4* 09/26/2016   T4F 1.69* 01/31/2017   TSH 5.71* 01/31/2017   LIP 29 01/31/2017   MG 2.1 03/25/2017   TROP 0.32* 01/31/2017

## 2017-03-26 NOTE — PHYSICAL THERAPY NOTE
PHYSICAL THERAPY EVALUATION - INPATIENT     Room Number: 332/332-A  Evaluation Date: 3/26/2017  Type of Evaluation: Initial  Physician Order: PT Eval and Treat    Presenting Problem: anemia  Reason for Therapy: Mobility Dysfunction and Discharge Planni History      Past Surgical History    CARDIAC PACEMAKER PLACEMENT      COLONOSCOPY,BIOPSY  2010,2005    VALVE REPLACEMENT      CABG         HOME SITUATION  Type of Home: House   Home Layout: Two level  Stairs to Enter : 0     Stairs to Bedroom: 0       Tamara railing?: A Little     AM-PAC Score:  Raw Score: 22   PT Approx Degree of Impairment Score: 20.91%   Standardized Score (AM-PAC Scale): 53.28   CMS Modifier (G-Code): CJ    FUNCTIONAL ABILITY STATUS  Gait Assessment   Gait Assistance:  (CGA)  Distance (ft)

## 2017-03-26 NOTE — PROGRESS NOTES
[place] [facility]    Progress Note    Melissa Trujillo Patient Status:  Inpatient    1935 MRN [id number]   Location [facility] 3W/SW Attending Sylvain Heard Day # 4 PCP AYANA AHMADI     Subjective:     HENT: Ne 03/26/2017   K 4.2 03/26/2017   CL 97 03/26/2017   CO2 32 03/26/2017   GLU 91 03/26/2017   CA 8.2* 03/26/2017   ALB 2.2* 03/23/2017   ALKPHO 67 03/23/2017   BILT 2.4* 03/23/2017   TP 6.7 03/23/2017   AST 36 03/23/2017   ALT 18 03/23/2017   PTT 28.8 02/07/2

## 2017-03-26 NOTE — PROGRESS NOTES
[place] [facility]    Progress Note    Ardath Real Patient Status:  Inpatient    1935 MRN [id number]   Location [facility] 3W/SW Attending Sylvain Heard Day # 4 PCP AYANA Humphries       Subjective:   Prachi Melvin bilateral.    Assessment/Plan     Congestive heart failure  -Acute on chronic with EF 35% continue coreg and po bumex  -SBP ,   HR 60 - AV sequential pacing  - asymptomatic, ambulatory, feeling good without LH,DZ    PLAN   Am discharge.   Dr Margret Rios no

## 2017-03-26 NOTE — HISTORICAL OFFICE NOTE
Nichelle Bazan  : 1935  ACCOUNT:  [id number]  [phone]  PCP:  [id number] DATE: 2017  DICTATED BY:  Reji Agee M.D., [facility]]    CHIEF COMPLAINT: [Followup of . CAD, of native vessels, Followup of .  CHF and left ventricular 35% cath .]    H lesions. MS: no limiting arthritis. NEURO: no localized deficits. HEM/LYMPH: denies easy bruising. ALL: no new food or environmental allergies.     PAST HISTORY: hypothyroidism, carotid artery disease, diabetes, anemia, diabetes, Anasarca,, Colon polyp, ski heart failure hospitalization in January of this year and maintains in [facility] class 3 on continuous home oxygen.   He does have some evidence of rales and a productive cough today, and we will increase his Bumex and obtain a chest x-ray 400MG     1 tab daily  03/08/17 Tylenol               325MG     1 tab every  4-6 hours as needed      Lamar Hammond M.D., [facility] [place]    SHELLI/roge - DD: 03/08/2017 - DT: 03/09/2017 - Job ID: [id number]

## 2017-03-27 NOTE — PROGRESS NOTES
[facility]acy Note: Route Optimization for Pantoprazole (PROTONIX)    Patient is currently on Pantoprazole (PROTONIX) 40 mg IV every 24 hours.    The patient meets the criteria to convert to the oral equivalent as established by the IV to Oral conversion pro

## 2017-03-27 NOTE — PROGRESS NOTES
[facility] Heart Cardiology   Progress Note    Ashia Vasquez Patient Status:  Inpatient    1935 MRN [id number]   Location [facility] 3W/SW Attending Sylvain Heardissa Day # 5 PCP Dameon Luevano Anemia  -per PCP and GI  -s/p EGD, may have outpatient video capsule study  -home aspirin was on hold but resumed on discharge per PCP    Recommendations:  -ambulate and check orthostatics  -discharge on home Bumex dosing  -follow-up in CHF clinic on disch

## 2017-03-27 NOTE — HOME CARE LIAISON
PATIENT IS ACTIVE WITH [facility] FOR NURSING. ORDERS RECEIVED TO RESUME NURSING, WITH THE ADDITION OF PT/OT AND LABS. MET WITH PATIENT AND HIS WIFE WHO WERE AGREEABLE TO ABOVE PLANS.

## 2017-03-27 NOTE — DISCHARGE PLANNING
3/27/17 CM Discharge planning / MDO for home health   Pt is current with [facility], advised [facility] orders on chart. [facility] to resume [facility] services at discharge.    Arlet Daley  [id number]

## 2017-03-27 NOTE — PHYSICAL THERAPY NOTE
PHYSICAL THERAPY TREATMENT NOTE - INPATIENT    Room Number: 332/332-A       Presenting Problem: anemia    Problem List  Principal Problem:    Anemia, unspecified type  Active Problems:    Heme positive stool    Fall, initial encounter    Anemia      ASSES education;Gait training    SUBJECTIVE   agreeable to mobility     Denies any symptoms with mobility   Denies pain   OBJECTIVE  Precautions: Cardiac (decrease activity tolerance )    WEIGHT BEARING RESTRICTION  Weight Bearing Restriction: None Functional Limits  Stoop/Curb Assistance: Not tested       Additional information:  Pt education   Fall risk prevention handout     Ankle pumps    Modified 5 pt RPE    Scale as well as presently important to modify activity and limit distances at present f

## 2017-03-28 NOTE — PROGRESS NOTES
Core measure update: Will be discharged with no ACE/ ARB or spironolactone secondary to low BP.  May consider as an outpatient

## 2017-03-29 NOTE — TELEPHONE ENCOUNTER
Pt.'s wife is contacted re: below standing orders for CBC every 2 weeks and she is agreeable with plan; César Valdivia, came in today and purnima blood; she thinks that RN is from [facility]; latter will be contacted tomorrow re: need for every 2 week

## 2017-03-30 NOTE — PATIENT INSTRUCTIONS
Please increase bumex to 2 mg daily    Continue tracking daily weights. Call with weight gain of 3 lbs overnight or concerning symptoms.    [phone]    32-64 oz fluid restriction    Less than 2000 mg sodium/salt diet    Appointment with Dr Chad Kwan 4/10/1

## 2017-03-30 NOTE — PROGRESS NOTES
[facility]        Fredrick Mcmillan is a [de-identified]year old male who presents to clinic for management of acute on chronic systolic heart failure and generalized anasarca.      The patient came into the emergency department on 1/31/17 for evaluation bec 03:08 PM   BUN 30* 03/30/2017 03:08 PM    03/30/2017 03:08 PM   K 4.1 03/30/2017 03:08 PM    03/30/2017 03:08 PM   CO2 34* 03/30/2017 03:08 PM   * 03/30/2017 03:08 PM   CA 8.1* 03/30/2017 03:08 PM   ALB 2.2* 03/23/2017 05:56 AM   ALKPHO Severe regurgitation. 7. Pulmonary arteries: PA peak pressure: 58mm Hg (S).     Heart cath: 2/7/17  RHC:  PA 36/8  PCW 10  RV 41/6  RA mean 7    AO 85%  PA 45.5%  CO 3.5; CI 2.1    Education:  Reviewed disease process, sodium/fluid restriction, and medicat

## 2017-03-30 NOTE — DISCHARGE SUMMARY
[facility]    PATIENT'S NAME: Jacqueline Sheppard   ATTENDING PHYSICIAN: Lázaro Heard MD   PATIENT ACCOUNT#:   [id number]    LOCATION:  [address] [place] [address] RECORD #:   [id number]       YOB: 1935  ADMISSION DATE:       03/2 anticoagulation possible. Iron and transfusions as needed. 2.   Mitral stenosis with bioprosthetic valve. 3.   Acute on chronic systolic congestive heart failure from cardiomyopathy. 4.   Hypertension.   5.   Pulmonary hypertension with tricuspid regurg

## 2017-03-30 NOTE — TELEPHONE ENCOUNTER
[facility] is contacted- this pt is a pt of theirs; call transferred to RN re: CBC order- spoke with Damian Akhtar; verbal order is given to her now for CBC every 2 weeks with last one having been done yesterday.

## 2017-04-05 ENCOUNTER — HOSPITAL (OUTPATIENT)
Dept: OTHER | Age: 82
End: 2017-04-05
Attending: INTERNAL MEDICINE

## 2017-04-05 LAB
ANALYZER ANC (IANC): ABNORMAL
ANION GAP SERPL CALC-SCNC: 6 MMOL/L (ref 10–20)
BASOPHILS # BLD: 0 THOUSAND/MCL (ref 0–0.3)
BASOPHILS NFR BLD: 0 %
BNP SERPL-MCNC: 901 PG/ML
BUN SERPL-MCNC: 34 MG/DL (ref 6–20)
BUN/CREAT SERPL: 24 (ref 7–25)
CALCIUM SERPL-MCNC: 8.2 MG/DL (ref 8.4–10.2)
CHLORIDE: 105 MMOL/L (ref 98–107)
CO2 SERPL-SCNC: 33 MMOL/L (ref 21–32)
CREAT SERPL-MCNC: 1.39 MG/DL (ref 0.67–1.17)
DIFFERENTIAL METHOD BLD: ABNORMAL
EOSINOPHIL # BLD: 0.2 THOUSAND/MCL (ref 0.1–0.5)
EOSINOPHIL NFR BLD: 2 %
ERYTHROCYTE [DISTWIDTH] IN BLOOD: 18.1 % (ref 11–15)
GLUCOSE SERPL-MCNC: 135 MG/DL (ref 65–99)
HEMATOCRIT: 24.2 % (ref 39–51)
HGB BLD-MCNC: 7.5 GM/DL (ref 13–17)
LYMPHOCYTES # BLD: 1.1 THOUSAND/MCL (ref 1–4)
LYMPHOCYTES NFR BLD: 16 %
MCH RBC QN AUTO: 34.6 PG (ref 26–34)
MCHC RBC AUTO-ENTMCNC: 31 GM/DL (ref 32–36.5)
MCV RBC AUTO: 111.5 FL (ref 78–100)
MONOCYTES # BLD: 0.9 THOUSAND/MCL (ref 0.3–0.9)
MONOCYTES NFR BLD: 13 %
NEUTROPHILS # BLD: 4.7 THOUSAND/MCL (ref 1.8–7.7)
NEUTROPHILS NFR BLD: 69 %
NEUTS SEG NFR BLD: ABNORMAL %
PERCENT NRBC: ABNORMAL
PLATELET # BLD: 179 THOUSAND/MCL (ref 140–450)
POTASSIUM SERPL-SCNC: 4.9 MMOL/L (ref 3.4–5.1)
RBC # BLD: 2.17 MILLION/MCL (ref 4.5–5.9)
SODIUM SERPL-SCNC: 139 MMOL/L (ref 135–145)
WBC # BLD: 6.8 THOUSAND/MCL (ref 4.2–11)

## 2017-04-05 NOTE — TELEPHONE ENCOUNTER
She Hein called with update . Pt to see her and Dr. Sayda Lombardo on 4-5-17 to discuss cardiomems and then to have cardiomems insertion on 4-13-17.  Discussed with Nona Carty, she recommends pt have bmp on on 4-5-17 and FU in HF clinic as scheduled on 4

## 2017-04-06 ENCOUNTER — PRIOR ORIGINAL RECORDS (OUTPATIENT)
Dept: OTHER | Age: 82
End: 2017-04-06

## 2017-04-06 LAB
BUN: 34 MG/DL
CALCIUM: 8.2 MG/DL
CHLORIDE: 105 MEQ/L
CREATININE, SERUM: 1.39 MG/DL
GLUCOSE: 135 MG/DL
HEMATOCRIT: 24.2 %
HEMOGLOBIN: 7.5 G/DL
POTASSIUM, SERUM: 4.9 MEQ/L
RED BLOOD COUNT: 10.1 X 10-6/U
SODIUM: 139 MEQ/L
WHITE BLOOD COUNT: 6.8 X 10-3/U

## 2017-04-06 NOTE — PATIENT INSTRUCTIONS
Please increase bumex to 2 mg in the morning and 1 mg in the afternoon (half tablet)     Continue tracking daily weights. Call with weight gain of 3 lbs overnight or concerning symptoms.    [phone]    32-64 oz fluid restriction    Less than 2000 mg sod

## 2017-04-06 NOTE — PROGRESS NOTES
[address] [place] [address]        Tom Billingsley is a [de-identified]year old male who presents to clinic for management of acute on chronic systolic heart failure and generalized anasarca.      The patient came into the emergency department on 1/31/17 for evaluation bec 03:08 PM   BUN 30* 03/30/2017 03:08 PM    03/30/2017 03:08 PM   K 4.1 03/30/2017 03:08 PM    03/30/2017 03:08 PM   CO2 34* 03/30/2017 03:08 PM   * 03/30/2017 03:08 PM   CA 8.1* 03/30/2017 03:08 PM   ALB 2.2* 03/23/2017 05:56 AM   ALKPHO Left atrium: The atrium was severely dilated. 6. Tricuspid valve: Severe regurgitation. 7. Pulmonary arteries: PA peak pressure: 58mm Hg (S).     Heart cath: 2/7/17  RHC:  PA 36/8  PCW 10  RV 41/6  RA mean 7    AO 85%  PA 45.5%  CO 3.5; CI 2.1    Educatio counseling, coordination of care and education related specifically to heart failure.       BALDOMERO Parada

## 2017-04-07 LAB
BUN: 33 MG/DL
CALCIUM: 8.4 MG/DL
CHLORIDE: 103 MEQ/L
CREATININE, SERUM: 1.51 MG/DL
GLUCOSE: 118 MG/DL
HEMATOCRIT: 22.8 %
HEMOGLOBIN: 7.5 G/DL
MCH: 35.2 PG
MCHC: 32.7 G/DL
MCV: 107.6 FL
PLATELETS: 193 K/UL
POTASSIUM, SERUM: 4.2 MEQ/L
RED BLOOD COUNT: 2.12 X 10-6/U
SODIUM: 138 MEQ/L
THYROID STIMULATING HORMONE: 1056 MLU/L
WHITE BLOOD COUNT: 5.8 X 10-3/U

## 2017-04-07 NOTE — TELEPHONE ENCOUNTER
Received order fax from [facility]  for phlebotomist order for CBC draw. Phlebotomist is coming week of 4/9. Requires MD signature.   Placed on MD desk for review

## 2017-04-10 ENCOUNTER — PRIOR ORIGINAL RECORDS (OUTPATIENT)
Dept: OTHER | Age: 82
End: 2017-04-10

## 2017-04-13 ENCOUNTER — HOSPITAL (OUTPATIENT)
Dept: OTHER | Age: 82
End: 2017-04-13
Attending: INTERNAL MEDICINE

## 2017-04-13 ENCOUNTER — DIAGNOSTIC TRANS (OUTPATIENT)
Dept: OTHER | Age: 82
End: 2017-04-13

## 2017-04-13 ENCOUNTER — PRIOR ORIGINAL RECORDS (OUTPATIENT)
Dept: OTHER | Age: 82
End: 2017-04-13

## 2017-04-13 LAB
ANALYZER ANC (IANC): ABNORMAL
ANION GAP SERPL CALC-SCNC: 10 MMOL/L (ref 10–20)
BNP SERPL-MCNC: 1242 PG/ML
BUN SERPL-MCNC: 29 MG/DL (ref 6–20)
BUN/CREAT SERPL: 19 (ref 7–25)
CALCIUM SERPL-MCNC: 8.4 MG/DL (ref 8.4–10.2)
CHLORIDE: 102 MMOL/L (ref 98–107)
CO2 SERPL-SCNC: 32 MMOL/L (ref 21–32)
CREAT SERPL-MCNC: 1.49 MG/DL (ref 0.67–1.17)
ERYTHROCYTE [DISTWIDTH] IN BLOOD: 17.8 % (ref 11–15)
FERRITIN SERPL-MCNC: 528 NG/ML (ref 26–388)
FOLATE RBC-MCNC: 834 NG/ML (ref 280–791)
GLUCOSE SERPL-MCNC: 123 MG/DL (ref 65–99)
HEMATOCRIT: 23.9 % (ref 39–51)
HGB BLD-MCNC: 7.5 GM/DL (ref 13–17)
MAGNESIUM SERPL-MCNC: 2 MG/DL (ref 1.7–2.4)
MCH RBC QN AUTO: 34.7 PG (ref 26–34)
MCHC RBC AUTO-ENTMCNC: 31.4 GM/DL (ref 32–36.5)
MCV RBC AUTO: 110.6 FL (ref 78–100)
PLATELET # BLD: 207 THOUSAND/MCL (ref 140–450)
POTASSIUM SERPL-SCNC: 3.9 MMOL/L (ref 3.4–5.1)
RBC # BLD: 2.16 MILLION/MCL (ref 4.5–5.9)
SODIUM SERPL-SCNC: 140 MMOL/L (ref 135–145)
VIT B12 SERPL-MCNC: 338 PG/ML (ref 211–911)
WBC # BLD: 6.9 THOUSAND/MCL (ref 4.2–11)

## 2017-04-14 ENCOUNTER — CHARTING TRANS (OUTPATIENT)
Dept: OTHER | Age: 82
End: 2017-04-14

## 2017-04-14 LAB
ALBUMIN SERPL-MCNC: 2.2 GM/DL (ref 3.6–5.1)
ALBUMIN/GLOB SERPL: 0.4 {RATIO} (ref 1–2.4)
ALP SERPL-CCNC: 77 UNIT/L (ref 45–117)
ALT SERPL-CCNC: 19 UNIT/L
ANALYZER ANC (IANC): ABNORMAL
ANION GAP SERPL CALC-SCNC: 6 MMOL/L (ref 10–20)
AST SERPL-CCNC: 33 UNIT/L
BASOPHILS # BLD: 0 THOUSAND/MCL (ref 0–0.3)
BASOPHILS NFR BLD: 1 %
BILIRUB CONJ SERPL-MCNC: 0.1 MG/DL (ref 0–0.2)
BILIRUB SERPL-MCNC: 0.5 MG/DL (ref 0.2–1)
BUN SERPL-MCNC: 30 MG/DL (ref 6–20)
BUN/CREAT SERPL: 20 (ref 7–25)
CALCIUM SERPL-MCNC: 8.3 MG/DL (ref 8.4–10.2)
CHLORIDE: 102 MMOL/L (ref 98–107)
CO2 SERPL-SCNC: 34 MMOL/L (ref 21–32)
CREAT SERPL-MCNC: 1.48 MG/DL (ref 0.67–1.17)
DIFFERENTIAL METHOD BLD: ABNORMAL
EOSINOPHIL # BLD: 0.1 THOUSAND/MCL (ref 0.1–0.5)
EOSINOPHIL NFR BLD: 2 %
ERYTHROCYTE [DISTWIDTH] IN BLOOD: 17.8 % (ref 11–15)
ERYTHROPOIETIN: 89.6 MUNIT/ML (ref 4–27)
FOLATE SERPL-MCNC: 9 NG/ML
GLOBULIN SER-MCNC: 5.4 GM/DL (ref 2–4)
GLUCOSE BLDC GLUCOMTR-MCNC: 143 MG/DL (ref 65–99)
GLUCOSE SERPL-MCNC: 109 MG/DL (ref 65–99)
HAPTOGLOB SERPL-MCNC: 141 MG/DL (ref 36–195)
HEMATOCRIT: 22.3 % (ref 39–51)
HGB BLD-MCNC: 7.1 GM/DL (ref 13–17)
IMMATURE RETIC FRACTION: 17 % (ref 1.5–16)
IRON SATN MFR SERPL: 123 % (ref 15–45)
IRON SERPL-MCNC: 228 MCG/DL (ref 65–175)
LDH SERPL-CCNC: 195 UNIT/L (ref 86–234)
LYMPHOCYTES # BLD: 1.5 THOUSAND/MCL (ref 1–4)
LYMPHOCYTES NFR BLD: 24 %
MCH RBC QN AUTO: 34.8 PG (ref 26–34)
MCHC RBC AUTO-ENTMCNC: 31.8 GM/DL (ref 32–36.5)
MCV RBC AUTO: 109.3 FL (ref 78–100)
MONOCYTES # BLD: 0.8 THOUSAND/MCL (ref 0.3–0.9)
MONOCYTES NFR BLD: 13 %
NEUTROPHILS # BLD: 3.8 THOUSAND/MCL (ref 1.8–7.7)
NEUTROPHILS NFR BLD: 60 %
NEUTS SEG NFR BLD: ABNORMAL %
PERCENT NRBC: ABNORMAL
PLATELET # BLD: 186 THOUSAND/MCL (ref 140–450)
POTASSIUM SERPL-SCNC: 3.3 MMOL/L (ref 3.4–5.1)
PROT SERPL-MCNC: 7.6 GM/DL (ref 6.4–8.2)
RBC # BLD: 2.04 MILLION/MCL (ref 4.5–5.9)
RETICS #: 53 THOUSAND/MCL (ref 10–120)
RETICS/RBC NFR: 2.6 % (ref 0.3–2.5)
SODIUM SERPL-SCNC: 139 MMOL/L (ref 135–145)
TIBC SERPL-MCNC: 185 MCG/DL (ref 250–450)
VIT B12 SERPL-MCNC: 344 PG/ML (ref 211–911)
WBC # BLD: 6.2 THOUSAND/MCL (ref 4.2–11)

## 2017-04-15 LAB
ANALYZER ANC (IANC): ABNORMAL
ANION GAP SERPL CALC-SCNC: 4 MMOL/L (ref 10–20)
BUN SERPL-MCNC: 31 MG/DL (ref 6–20)
BUN/CREAT SERPL: 18 (ref 7–25)
CALCIUM SERPL-MCNC: 8.3 MG/DL (ref 8.4–10.2)
CHLORIDE: 99 MMOL/L (ref 98–107)
CO2 SERPL-SCNC: 39 MMOL/L (ref 21–32)
CREAT SERPL-MCNC: 1.71 MG/DL (ref 0.67–1.17)
ERYTHROCYTE [DISTWIDTH] IN BLOOD: 17.2 % (ref 11–15)
GLUCOSE SERPL-MCNC: 101 MG/DL (ref 65–99)
HEMATOCRIT: 21.1 % (ref 39–51)
HEMATOCRIT: 25.2 % (ref 39–51)
HGB BLD-MCNC: 6.7 GM/DL (ref 13–17)
HGB BLD-MCNC: 8.2 GM/DL (ref 13–17)
MCH RBC QN AUTO: 34.4 PG (ref 26–34)
MCHC RBC AUTO-ENTMCNC: 31.8 GM/DL (ref 32–36.5)
MCV RBC AUTO: 108.2 FL (ref 78–100)
METHYLMALONIC ACID: 850
PLATELET # BLD: 185 THOUSAND/MCL (ref 140–450)
POTASSIUM SERPL-SCNC: 3.3 MMOL/L (ref 3.4–5.1)
RBC # BLD: 1.95 MILLION/MCL (ref 4.5–5.9)
SODIUM SERPL-SCNC: 139 MMOL/L (ref 135–145)
WBC # BLD: 5.8 THOUSAND/MCL (ref 4.2–11)

## 2017-04-16 LAB
ALBUMIN SERPL-MCNC: 2.3 GM/DL (ref 3.6–5.1)
ALBUMIN/GLOB SERPL: 0.4 {RATIO} (ref 1–2.4)
ALP SERPL-CCNC: 86 UNIT/L (ref 45–117)
ALT SERPL-CCNC: 19 UNIT/L
ANALYZER ANC (IANC): ABNORMAL
ANION GAP SERPL CALC-SCNC: 7 MMOL/L (ref 10–20)
AST SERPL-CCNC: 39 UNIT/L
BASOPHILS # BLD: 0 THOUSAND/MCL (ref 0–0.3)
BASOPHILS NFR BLD: 1 %
BILIRUB SERPL-MCNC: 0.8 MG/DL (ref 0.2–1)
BUN SERPL-MCNC: 33 MG/DL (ref 6–20)
BUN/CREAT SERPL: 19 (ref 7–25)
CALCIUM SERPL-MCNC: 8.5 MG/DL (ref 8.4–10.2)
CHLORIDE: 96 MMOL/L (ref 98–107)
CO2 SERPL-SCNC: 38 MMOL/L (ref 21–32)
CREAT SERPL-MCNC: 1.73 MG/DL (ref 0.67–1.17)
DIFFERENTIAL METHOD BLD: ABNORMAL
EOSINOPHIL # BLD: 0.2 THOUSAND/MCL (ref 0.1–0.5)
EOSINOPHIL NFR BLD: 4 %
ERYTHROCYTE [DISTWIDTH] IN BLOOD: 18.3 % (ref 11–15)
GLOBULIN SER-MCNC: 5.7 GM/DL (ref 2–4)
GLUCOSE SERPL-MCNC: 154 MG/DL (ref 65–99)
HEMATOCRIT: 26.7 % (ref 39–51)
HGB BLD-MCNC: 8.6 GM/DL (ref 13–17)
IGA SERPL-MCNC: 111 MG/DL (ref 82–453)
IGG SERPL-MCNC: 3430 MG/DL (ref 751–1560)
IGM SERPL-MCNC: 15 MG/DL (ref 46–304)
KAPPA LC FREE SER NEPH-MCNC: 4.35 MG/DL (ref 0.33–1.94)
KAPPA LC/LAMBDA SER: 0.08 {RATIO} (ref 0.26–1.65)
LAMBDA LC FREE SER NEPH-MCNC: 56 MG/DL (ref 0.57–2.63)
LYMPHOCYTES # BLD: 1.5 THOUSAND/MCL (ref 1–4)
LYMPHOCYTES NFR BLD: 26 %
M PROTEIN 1 SERPL ELPH-MCNC: 2.39 GM/DL
M PROTEIN 2 SERPL ELPH-MCNC: ABNORMAL GM/DL
MCH RBC QN AUTO: 33.6 PG (ref 26–34)
MCHC RBC AUTO-ENTMCNC: 32.2 GM/DL (ref 32–36.5)
MCV RBC AUTO: 104.3 FL (ref 78–100)
MONOCYTES # BLD: 0.9 THOUSAND/MCL (ref 0.3–0.9)
MONOCYTES NFR BLD: 15 %
NEUTROPHILS # BLD: 3.1 THOUSAND/MCL (ref 1.8–7.7)
NEUTROPHILS NFR BLD: 54 %
NEUTS SEG NFR BLD: ABNORMAL %
PATH INTERP BLD-IMP: NORMAL
PATH INTERP SPEC-IMP: ABNORMAL
PERCENT NRBC: ABNORMAL
PLATELET # BLD: 176 THOUSAND/MCL (ref 140–450)
POTASSIUM SERPL-SCNC: 3.7 MMOL/L (ref 3.4–5.1)
PROT SERPL-MCNC: 7.9 GM/DL (ref 6.4–8.2)
PROT SERPL-MCNC: 8 GM/DL (ref 6.4–8.2)
RBC # BLD: 2.56 MILLION/MCL (ref 4.5–5.9)
SODIUM SERPL-SCNC: 137 MMOL/L (ref 135–145)
WBC # BLD: 5.7 THOUSAND/MCL (ref 4.2–11)

## 2017-04-17 LAB
ANION GAP SERPL CALC-SCNC: 6 MMOL/L (ref 10–20)
BUN SERPL-MCNC: 37 MG/DL (ref 6–20)
BUN/CREAT SERPL: 22 (ref 7–25)
CALCIUM SERPL-MCNC: 8.3 MG/DL (ref 8.4–10.2)
CHLORIDE: 98 MMOL/L (ref 98–107)
CO2 SERPL-SCNC: 38 MMOL/L (ref 21–32)
CREAT SERPL-MCNC: 1.65 MG/DL (ref 0.67–1.17)
GLUCOSE SERPL-MCNC: 97 MG/DL (ref 65–99)
POTASSIUM SERPL-SCNC: 3.7 MMOL/L (ref 3.4–5.1)
SODIUM SERPL-SCNC: 138 MMOL/L (ref 135–145)

## 2017-04-18 ENCOUNTER — IMAGING SERVICES (OUTPATIENT)
Dept: OTHER | Age: 82
End: 2017-04-18

## 2017-04-18 LAB
ALBUMIN SERPL-MCNC: 2.2 GM/DL (ref 3.6–5.1)
ALBUMIN/GLOB SERPL: 0.4 {RATIO} (ref 1–2.4)
ALP SERPL-CCNC: 78 UNIT/L (ref 45–117)
ALT SERPL-CCNC: 16 UNIT/L
ANALYZER ANC (IANC): ABNORMAL
ANION GAP SERPL CALC-SCNC: 6 MMOL/L (ref 10–20)
AST SERPL-CCNC: 39 UNIT/L
BASOPHILS # BLD: 0 THOUSAND/MCL (ref 0–0.3)
BASOPHILS NFR BLD: 1 %
BILIRUB SERPL-MCNC: 0.5 MG/DL (ref 0.2–1)
BUN SERPL-MCNC: 38 MG/DL (ref 6–20)
BUN/CREAT SERPL: 25 (ref 7–25)
CALCIUM SERPL-MCNC: 8.2 MG/DL (ref 8.4–10.2)
CHLORIDE: 97 MMOL/L (ref 98–107)
CO2 SERPL-SCNC: 37 MMOL/L (ref 21–32)
CREAT SERPL-MCNC: 1.54 MG/DL (ref 0.67–1.17)
DIFFERENTIAL METHOD BLD: ABNORMAL
EOSINOPHIL # BLD: 0.2 THOUSAND/MCL (ref 0.1–0.5)
EOSINOPHIL NFR BLD: 3 %
ERYTHROCYTE [DISTWIDTH] IN BLOOD: 17 % (ref 11–15)
GLOBULIN SER-MCNC: 5.5 GM/DL (ref 2–4)
GLUCOSE SERPL-MCNC: 125 MG/DL (ref 65–99)
HEMATOCRIT: 25 % (ref 39–51)
HGB BLD-MCNC: 7.9 GM/DL (ref 13–17)
LYMPHOCYTES # BLD: 1.4 THOUSAND/MCL (ref 1–4)
LYMPHOCYTES NFR BLD: 21 %
MCH RBC QN AUTO: 33.6 PG (ref 26–34)
MCHC RBC AUTO-ENTMCNC: 31.6 GM/DL (ref 32–36.5)
MCV RBC AUTO: 106.4 FL (ref 78–100)
MONOCYTES # BLD: 1 THOUSAND/MCL (ref 0.3–0.9)
MONOCYTES NFR BLD: 16 %
NEUTROPHILS # BLD: 3.8 THOUSAND/MCL (ref 1.8–7.7)
NEUTROPHILS NFR BLD: 59 %
NEUTS SEG NFR BLD: ABNORMAL %
PERCENT NRBC: ABNORMAL
PLATELET # BLD: 173 THOUSAND/MCL (ref 140–450)
POTASSIUM SERPL-SCNC: 3.4 MMOL/L (ref 3.4–5.1)
PROT SERPL-MCNC: 7.7 GM/DL (ref 6.4–8.2)
RBC # BLD: 2.35 MILLION/MCL (ref 4.5–5.9)
SODIUM SERPL-SCNC: 137 MMOL/L (ref 135–145)
WBC # BLD: 6.4 THOUSAND/MCL (ref 4.2–11)

## 2017-04-19 ENCOUNTER — IMAGING SERVICES (OUTPATIENT)
Dept: OTHER | Age: 82
End: 2017-04-19

## 2017-04-19 LAB
ANALYZER ANC (IANC): ABNORMAL
COLLECT DURATION TIME UR: 24 HR
CREAT SERPL-MCNC: 1.57 MG/DL (ref 0.67–1.17)
ERYTHROCYTE [DISTWIDTH] IN BLOOD: 16.7 % (ref 11–15)
HEMATOCRIT: 27.7 % (ref 39–51)
HGB BLD-MCNC: 8.8 GM/DL (ref 13–17)
MAGNESIUM SERPL-MCNC: 2.4 MG/DL (ref 1.7–2.4)
MCH RBC QN AUTO: 33.8 PG (ref 26–34)
MCHC RBC AUTO-ENTMCNC: 31.8 GM/DL (ref 32–36.5)
MCV RBC AUTO: 106.5 FL (ref 78–100)
PATH INTERP SPEC-IMP: ABNORMAL
PATH INTERP SPEC-IMP: ABNORMAL
PLATELET # BLD: 200 THOUSAND/MCL (ref 140–450)
POTASSIUM SERPL-SCNC: 3.8 MMOL/L (ref 3.4–5.1)
PROT 24H UR-MRATE: 434 MG/24 HR (ref 0–148)
PROT ?TM UR-MCNC: 14 MG/DL
RBC # BLD: 2.6 MILLION/MCL (ref 4.5–5.9)
SPECIMEN VOL UR: 3100 ML
WBC # BLD: 7.3 THOUSAND/MCL (ref 4.2–11)

## 2017-04-21 NOTE — PATIENT INSTRUCTIONS
Please hold all diuretics (bumex and metolazone) until Monday when we talk after transmitting your CardioMems reading    Continue tracking daily weights. Call with weight gain of 3 lbs overnight or concerning symptoms.    [phone]    32-64 oz fluid rest

## 2017-04-21 NOTE — PROGRESS NOTES
[facility]        Angie Mulligan is a [de-identified]year old male who presents to clinic for management of acute on chronic systolic heart failure and generalized anasarca.      The patient came into the emergency department on 1/31/17 for evaluation bec negative for bleeding  Musculoskeletal:negative for muscle weakness    Objective:    Lab Results  Component Value Date/Time   WBC 5.8 04/06/2017 03:15 PM   HGB 7.5* 04/06/2017 03:15 PM   HCT 22.8* 04/06/2017 03:15 PM    04/06/2017 03:15 PM   CREATSE septum: Thickness was mildly increased. 3. Aortic valve: Mild regurgitation. Mean gradient: 4mm Hg (S). Valve area:     1.8cm^2(VTI). 4. Mitral valve: A bioprosthesis was present. Mean gradient: 5mm Hg (D). Valve     area: 2.59cm^2.  Valve area by continu is soft no hematoma. Spoke with Dr Alma Metzger regarding BMP results.  Will hold all diuretics for 2 days and discuss CardioMems readings with patient first thing Monday am.       Plan:  Please hold all diuretics (bumex and metolazone) until Monday when we zachariah

## 2017-04-24 PROBLEM — R53.1 WEAKNESS GENERALIZED: Status: ACTIVE | Noted: 2017-01-01

## 2017-04-24 PROBLEM — E86.0 DEHYDRATION: Status: ACTIVE | Noted: 2017-01-01

## 2017-04-24 PROBLEM — R79.89 AZOTEMIA: Status: ACTIVE | Noted: 2017-01-01

## 2017-04-24 PROBLEM — N17.9 ACUTE KIDNEY INJURY (HCC): Status: ACTIVE | Noted: 2017-01-01

## 2017-04-24 PROBLEM — R73.9 HYPERGLYCEMIA: Status: ACTIVE | Noted: 2017-01-01

## 2017-04-24 PROBLEM — N28.9 ACUTE RENAL INSUFFICIENCY: Status: ACTIVE | Noted: 2017-01-01

## 2017-04-24 PROBLEM — E87.1 HYPONATREMIA: Status: ACTIVE | Noted: 2017-01-01

## 2017-04-24 PROBLEM — N17.9 ACUTE RENAL FAILURE (ARF) (HCC): Status: ACTIVE | Noted: 2017-01-01

## 2017-04-24 PROBLEM — E87.3 METABOLIC ALKALOSIS: Status: ACTIVE | Noted: 2017-01-01

## 2017-04-24 NOTE — TELEPHONE ENCOUNTER
Spoke with patient's wife regarding today's CardioMems reading. Wife states patient is still very weak and unable to get out of bed. Instructed wife to give the patient 1 mg of bumex today. Will call patient tomorrow after transmitting numbers.  Has follow

## 2017-04-25 NOTE — ED NOTES
Patient to ED from home for weakness. Patient's wife states that he was recently discharged after having to have fluid pulled from \"around his heart. \" Patient is alert and orientated x4, denies pain, states that he feels \"very weak. \" Denies further com

## 2017-04-25 NOTE — CONSULTS
[facility]    PATIENT'S NAME: Rahel Newby   ATTENDING PHYSICIAN: Maria Alejandra Rogers. Orlando Choudhary MD   CONSULTING PHYSICIAN: Rue Closs.  Gema Norman MD   PATIENT ACCOUNT#:   [id number]    LOCATION:  [facility] 23 [facility] 10  MEDICAL RECORD #:   [id number]       DATE OF t.i.d. but now 1 mg every other day; now, even that has been held. Metolazone has been held. Spironolactone is on hold. Coreg 3.125 b.i.d., Plavix 75 q.a.m. ALLERGIES:  No known drug allergies. SOCIAL HISTORY:  He never smoked.   Occasional caffein coags.    PLAN:    1. Gentle hydration. 2.   Dobutamine drip. 3.   Hold diuretics and Coreg. 4.   Dr. Ramón Lynn will see the patient for advanced heart failure management in the morning. Thank you for this consultation. Dictated By Alex Vieira.  Deb Vazquez,

## 2017-04-25 NOTE — DIETARY NOTE
ADULT NUTRITION INITIAL ASSESSMENT    Pt is at moderate nutrition risk. Pt meets malnutrition criteria.       CRITERIA FOR MALNUTRITION DIAGNOSIS:  Criteria for non-severe malnutrition diagnosis: acute illness/injury related to energy intake less than 75% congestive heart failure, unspecified congestive heart failure type ([facility] [I50.9]      PERTINENT PAST MEDICAL HISTORY:   Past Medical History   Diagnosis Date   • Diabetes mellitus ([facility]    • Colon polyp    • Essential hypertension    • Hyperlipidemia    • Nutrition Goals:      halt wt loss, regain wt as able, PO and supplement greater than 75% of needs and labs WNL      DIETITIAN FOLLOW UP: RD to follow up within 7 days   [address], [address]  ([id number])

## 2017-04-25 NOTE — HISTORICAL OFFICE NOTE
Ruthy Inman  : 1935  ACCOUNT:  [id number]  [phone]  PCP:    MACY DATE: 04/10/2017  DICTATED BY:  Ernesto Sexton M.D., F.A.C.C. ]    CHIEF COMPLAINT: [Followup of .  CHF, left ventricular 35% cath , Followup of Cardiomyopathy, ischemic an HISTORY: Acute on chronic congestive heart failure 1/2017, CABG 1988, CAD, cardiomyopathy, hyperlipidemia, cardiac catheterization 1988, cardiac catheterization 2006, colonoscopy- biopsy, hypertension, left CEA 2004, Pacemaker 2004, Redo CABG 2006 and Valv hospitalizations, now with recurrent volume overload and lower extremity edema with his current medical therapy, despite appropriate dietary modifications and medication adherence.  The patient has more advanced disease than otherwise appreciated, as well a Bumetanide            2MG       One tablet three times per day           04/10/17 Docusate Sodium       100MG     twice daily                              04/10/17 Omeprazole            20MG      daily                                    04/10/17 Probiotic less than 12. He had a CT of the head showing no intracranial hemorrhage, mass effect, midline shift, or hydrocephalus and no advancing brain mass.  Small lucencies in the parietal bones corresponding to the lucencies seen prior radiograph and are nonsp CHF, LVEF of 35%, and a cath 11/06, and edema. The patient had the following assessment:  1. Acute on chronic systolic CHF. 2.  CAD. 3.  Fall. 4.  Anemia.       The patients echo on 2/01/17 showed an EF of 35-40%, hypokinesis, mild AI, severe TR, a

## 2017-04-25 NOTE — H&P
[facility]    History & Physical    Date:  4/24/2017   Date of Admission:  4/24/2017      Chief Complaint:   Erika Mullen is a(n) 81year old male with hypotension.     HPI:   The patient is a history of cardiomyopathy with hypertension Rash    (Not in a hospital admission)    Review of Systems:   Vision normal. Ear nose and throat normal except for being hard of hearing. Bowel normal. Bladder function normal. No depression. No thyroid disease. No rash.  Muscles and joints unremarkable exc edema. Recommendations:  1. As per cardiology  2. Gentle hydration  3. Dobutamine  4. Will monitor in the ICU  5. Will track weights closely  6. Digoxin level    2.   Anemia–patient has had extensive evaluation recently    Recommendations:  will fo

## 2017-04-25 NOTE — PROGRESS NOTES
[facility]    Progress Note      Assessment and Plan:    1.  Hypotension–the patient has presumed intravascular depletion status post significant volume loss with diuresis initiated after the placement of the intra-pulmonary artery device.   rate and rhythm no murmur. Abdomen nontender, without hepatosplenomegaly and no mass appreciable. Extremities without clubbing cyanosis nor edema. Neurologic grossly intact with symmetric tone and strength and reflex.      Results:     Lab Results  Co

## 2017-04-25 NOTE — PLAN OF CARE
Problem: NEUROLOGICAL - ADULT  Goal: Achieves stable or improved neurological status  INTERVENTIONS  - Assess for and report changes in neurological status  - Initiate measures to prevent increased intracranial pressure  - Maintain blood pressure and fluid PICC line for dobutamine.      Problem: METABOLIC/FLUID AND ELECTROLYTES - ADULT  Goal: Electrolytes maintained within normal limits  INTERVENTIONS:  - Monitor labs and rhythm and assess patient for signs and symptoms of electrolyte imbalances  - Administer Assess pt frequently for physical needs  - Identify cognitive and physical deficits and behaviors that affect risk of falls.   - McClure fall precautions as indicated by assessment.  - Educate pt/family on patient safety including physical limitations  -

## 2017-04-25 NOTE — PROGRESS NOTES
S: fatigued     O:   Blood pressure 131/46, pulse 60, temperature 98.2 °F (36.8 °C), temperature source Temporal, resp. rate 17, height 167.6 cm (5' 6\"), weight 100 lb 11.2 oz (45.677 kg), SpO2 98 %.        04/25/17  1500 04/25/17  1600 04/25/17  1615 04/2 Patient Active Problem List:     History of congestive heart failure     Anasarca     Acute on chronic congestive heart failure, unspecified congestive heart failure type (HCC)     Other ascites     Anemia, unspecified type     Heme positive stoo suspicious for plasma cell dyscrasia / possible AL - amyloid   - outpatient Heme eval was planned for Bone marrow biopsy     CKD   - hold fluids and diuretics today. - may need to tolerate higher PAD to manage.   - may need midodrine support.      Lissy Muir

## 2017-04-25 NOTE — PROGRESS NOTES
Patient with cardiomems implant. PAD up to  23 yesterday. u/o approximately 600 last shift. Creatinine down to 2.1 from 4.3(4/21). Currently D5.45 running at 86 mls per hour, per discussion with Dr. Alma Metzger, will stop these fluids for now.

## 2017-04-25 NOTE — PROGRESS NOTES
Cardiology (Consult dictated)    Assessment:  1. Hypotension, probably due to volume depletion    2. Cardiomyopathy    3. CAD, remote CABG & redo     4.  Severe TR with recurrent RV failure      Plan:  Gentle hydration  Dobutamine drip  Hold diuretics and c

## 2017-04-25 NOTE — DISCHARGE PLANNING
MARYBEL met with the patient and his wife at bedside. The patient and his wife live in a ranch with stairs to the basement. His wife has been assisting with personal care as needed.   They have a RW/cane, home oxygen ([employer]), shower chair and raised toilet se

## 2017-04-26 NOTE — PROGRESS NOTES
Received patient from on going RN. awake and alert. denies any chest pain or sob.waiting for bed in tele. family at bed side. dobutanine drip is running. pt will transfer to room 321. report given to floor RN.

## 2017-04-26 NOTE — PROGRESS NOTES
[facility]    Progress Note    Fredrick Mcmillan Patient Status:  Inpatient    1935 MRN [id number]   Location [facility] 2W/SW Attending Lucio Celeste MD   Hosp Day # 2 PCP Yo Bazan       Subjective:   George Boothe 1,000 mcg Oral Daily   • folic acid  1 mg Oral Daily   • Levothyroxine Sodium  112 mcg Oral Before breakfast   • Pantoprazole Sodium  20 mg Oral QAM AC       Current PRN Inpatient Meds:      dextrose, Glucose-Vitamin C, docusate sodium    Results:     Lab superimposed infiltrates and small bilateral effusions. Xr Chest Ap Portable  (cpt=71010)    4/25/2017  CONCLUSION:  1. Persistent but slightly decreased infiltrate/consolidation at the right lung base with a small right pleural effusion.  Minimal l O's  -weights   -transfer to tele  -Cards following    Anemia–patient has had extensive evaluation recently  -will ask heme/onc to see      History of anasarca with hyperammonemia status post paracenteses  -Lactulose.  -observe    Diabetes mellitus  -bibi

## 2017-04-26 NOTE — PROGRESS NOTES
S: fatigued     O:   Blood pressure 131/64, pulse 60, temperature 98.9 °F (37.2 °C), temperature source Temporal, resp. rate 19, height 167.6 cm (5' 6\"), weight 125 lb 12.8 oz (57.063 kg), SpO2 97 %.        04/26/17  0300 04/26/17  0400 04/26/17  0500 04/2 Problem List:     History of congestive heart failure     Anasarca     Acute on chronic congestive heart failure, unspecified congestive heart failure type ([facility]     Other ascites     Anemia, unspecified type     Heme positive stool     Fall, initial encou possible AL - amyloid   - outpatient Heme eval was planned for Bone marrow biopsy     CKD   - hold fluids and diuretics today. - may need to tolerate higher PAD to manage.   - may need midodrine support.      Lethargy   - Ammonia level 152: question relat

## 2017-04-26 NOTE — PROGRESS NOTES
[facility]    Progress Note      Assessment and Plan:    1.  Hypotension–the patient has presumed intravascular depletion status post significant volume loss with diuresis initiated after the placement of the intra-pulmonary artery device.   weight 125 lb 12.8 oz (57.063 kg), SpO2 97 %. Physical Exam alert male without distress  HEENT examination is unremarkable with pupils equal round and reactive to light and accommodation. Neck without adenopathy, thyromegaly, JVD nor bruit.    Lungs di

## 2017-04-26 NOTE — PLAN OF CARE
CARDIOVASCULAR - ADULT    • Maintains optimal cardiac output and hemodynamic stability Progressing        Diabetes/Glucose Control    • Glucose maintained within prescribed range Progressing        METABOLIC/FLUID AND ELECTROLYTES - ADULT    • Electrolytes

## 2017-04-26 NOTE — PROGRESS NOTES
Core measure update: Currently no beta-blocker secondary to need for inotropic support. Currently no ace/arb or Spironolactone secondary to concern for renal function.

## 2017-04-27 PROBLEM — E46 MALNUTRITION (HCC): Status: ACTIVE | Noted: 2017-01-01

## 2017-04-27 NOTE — PROGRESS NOTES
[facility]    Progress Note    Bettyjo Schilder Patient Status:  Inpatient    1935 MRN [id number]   Location [facility] 3W/SW Attending Dank Corea MD   Hosp Day # 3 PCP Ori Guerrero     Subjective:     Constitution porcine  -dobutamine off  -cards following    Acute on chronic kidney disease  -monitor  -improving    Malnutrition  - dietician eval  - supplements    Dispo: SW for d/c needs.  PT/OT      Results:     Lab Results  Component Value Date   WBC 7.6 04/27/2017

## 2017-04-27 NOTE — PROGRESS NOTES
[facility]    Progress Note      Assessment and Plan:    1.  Hypotension with cardiomyopathy. The patient also has a right subclavian artery stenosis. The hypotension is now resolved. The patient is now off dobutamine.   He still feels ex bases to auscultation and percussion. Cardiac regular rate and rhythm no murmur. Abdomen nontender, without hepatosplenomegaly and no mass appreciable. Extremities without clubbing cyanosis 1+ lower extremity edema.    Neurologic grossly intact with sy

## 2017-04-27 NOTE — PROGRESS NOTES
[facility]    Progress Note    Evelyn Lomas Patient Status:  Inpatient    1935 MRN [id number]   Location [facility] 2W/SW Attending Sondra Gaviria MD   Hosp Day # 3 PCP Keily Arriaga       Subjective:   Boris Slater • Fluticasone Furoate-Vilanterol  1 puff Inhalation Daily   • Clopidogrel Bisulfate  75 mg Oral Daily   • cyanocobalamin  1,000 mcg Oral Daily   • folic acid  1 mg Oral Daily   • Levothyroxine Sodium  112 mcg Oral Before breakfast   • Pantoprazole Sodium 02/07/2017   INR 1.5* 02/02/2017   INR 2.1* 11/03/2016     Imaging/EKG:   Xr Chest Ap Portable  (cpt=71010)    4/26/2017  CONCLUSION:  1. Mild cardiomegaly. Sternotomy changes. CABG.  2. Bilateral perihilar bibasilar bony congestive changes and/or superimpo

## 2017-04-27 NOTE — DISCHARGE PLANNING
4/27/17 CM Discharge planning   Met with pt and wife at bedside. Pt resides with wife, family home, is current with [facility] and has home O2.   Discharge planning discussed, wife plans for pt to return home with continued [place] [phone] services, has declined

## 2017-04-27 NOTE — PROGRESS NOTES
S: fatigued     O:   Blood pressure 125/47, pulse 60, temperature 98.2 °F (36.8 °C), temperature source Oral, resp.  rate 20, height 167.6 cm (5' 6\"), weight 127 lb 12.8 oz (57.97 kg), SpO2 98 %.       04/26/17  1654 04/26/17  1946 04/27/17  0458 04/27/17 112 mcg Oral Before breakfast   • Pantoprazole Sodium  20 mg Oral QAM AC           Patient Active Problem List:     History of congestive heart failure     Anasarca     Acute on chronic congestive heart failure, unspecified congestive heart failure type (H Creat slightly higher than prior baseline 1.7   -  Goal PAD < 18 should correlate to CVP < 12 as RHF was severe on hemodynamics on recent RHC. - BP maintaining better today. Weight ternding up. Will resume lower dose diuretics.      Anemia  - Complete

## 2017-04-27 NOTE — CONSULTS
[facility] [place]    PATIENT'S NAME: Jerson Asif   ATTENDING PHYSICIAN: Iggy Dan MD   CONSULTING PHYSICIAN: Heather Botello.  MD Cresencio   PATIENT ACCOUNT#:   [id number]    LOCATION:  [address] [place] [address] #:   [id number]       DATE OF BIRTH hypertension, diabetes mellitus. MEDICATIONS:  Amiodarone, aspirin, clopidogrel, fluticasone, folic acid, insulin, midodrine, Protonix, potassium, B12. ALLERGIES:  No known drug allergies. SOCIAL HISTORY:  The patient is a former smoker.   He does iron deficiency in the past.  Of note, the patient has also had evidence of portal gastropathy and recurrent ascites with elevated SAAG, and while this all may be secondary to his heart failure and secondarily with congestive hepatopathy, resultant liver d

## 2017-04-27 NOTE — PHYSICAL THERAPY NOTE
Attempted PT eval, pt sleeping soundly, did not arouse easily, per rn pt had busy morning and to let pt cont to sleep.

## 2017-04-28 NOTE — OCCUPATIONAL THERAPY NOTE
OCCUPATIONAL THERAPY EVALUATION - INPATIENT     Room Number: 321/321-A  Evaluation Date: 4/28/2017  Type of Evaluation: Initial  Presenting Problem:  (hypotension with cardiomyopathy with macrocytic anemia )    Physician Order: IP Consult to Occupational T activities; Energy conservation/work simplification techniques;ADL training;IADL training;Functional transfer training;UE strengthening/ROM; Endurance training;Patient/Family education;Patient/Family training;Equipment eval/education; Neuromuscluar reeducatio dressing.  Pt uses 3 L of at home     SUBJECTIVE  \" My back hurts - I don't want to sit up in the chair\"     Patient self-stated goal is \" I only want to go home and my wife can help\"     OCCUPATIONAL THERAPY EXAMINATION      OBJECTIVE  Precautions: Car ASSESSMENT  Grooming: set up   Bathing: min a d/t standing tolerance, balance and decreased activity tolerance   Evelyn-care: min a for donning of depends over feet - cga in standing for pericare   Upper Extremity Dressing: set up   Lower Extremity Dressing:

## 2017-04-28 NOTE — PROGRESS NOTES
[facility]    Progress Note    Caleb Patel Patient Status:  Inpatient    1935 MRN [id number]   Location [facility] 3W/SW Attending Andrzej Wall MD   Hosp Day # 4 PCP Kristin Couch     Subjective:     Constitution 01/31/2017   LIP 29 01/31/2017   MG 1.8 03/29/2017   TROP 0.12* 04/24/2017   B12 932* 04/27/2017       Xr Chest Ap Portable  (cpt=71010)    4/27/2017  CONCLUSION:  1. Overall unchanged chest with moderate cardiomegaly and normal pulmonary vascularity.  Mode

## 2017-04-28 NOTE — PROGRESS NOTES
[facility]    Progress Note    Christian Wandasheri Patient Status:  Inpatient    1935 MRN [id number]   Location [facility] 3W/SW Attending Amy Magana MD   Hosp Day # 4 PCP Olamide Ziegler       Subjective:   Jann Felty Furoate-Vilanterol  1 puff Inhalation Daily   • Clopidogrel Bisulfate  75 mg Oral Daily   • cyanocobalamin  1,000 mcg Oral Daily   • folic acid  1 mg Oral Daily   • Levothyroxine Sodium  112 mcg Oral Before breakfast   • Pantoprazole Sodium  20 mg Oral QAM than left and small bilateral pleural effusions overall unchanged. Further followup studies are advised.              Assessment and Plan:   Hypotension–the patient has presumed intravascular depletion status post significant volume loss with diuresis     c

## 2017-04-28 NOTE — PROGRESS NOTES
[facility]    Hematology/Oncology   Progress Note    Evelyn Lomas Patient Status:  Inpatient    1935 MRN [id number]   Location [facility] 3W/SW Attending Sondra Gaviria MD   [facility] Day # 4 [facility] [address] [facility] Ext Em (cpt=93923)    4/26/2017  CONCLUSION: Decreased segmental pressures throughout the right arm with dampened PVR waveforms, likely reflecting a more proximal stenosis, such as within the right subclavian artery. Medications reviewed.     Asse

## 2017-04-28 NOTE — PROGRESS NOTES
[facility] - [facility]    Hematology/Oncology   Progress Note    [place] De[place] Patient Status:  Inpatient    1935 MRN [id number]   Location [facility] 3W/SW Attending Lyndsey Christensen MD   Hosp Day # 3 [facility] [address] [facility] Xr Chest Ap Portable  (cpt=71010)    4/25/2017  CONCLUSION:  1. Persistent but slightly decreased infiltrate/consolidation at the right lung base with a small right pleural effusion. Minimal left lung base atelectasis.  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      Duncan Clarke MD

## 2017-04-28 NOTE — PROGRESS NOTES
[facility]    Progress Note    Tom Billingsley Patient Status:  Inpatient    1935 MRN [id number]   Location [facility] 3W/SW Attending Anali Lopes MD   Hosp Day # 4 PCP Soraya Magana     Subjective:     Constitution monitor    Diabetes mellitus  -sliding scale  - a1c to add on labs    Acute on chronic CHF  Cardiomyopathy with mitral regurgitation status post MVR porcine  -dobutamine off  -cards following  - cardiomems reading daily, today 21    Acute on chronic kidney

## 2017-04-28 NOTE — PHYSICAL THERAPY NOTE
PHYSICAL THERAPY EVALUATION - INPATIENT     Room Number: 321/321-A  Evaluation Date: 4/28/2017  Type of Evaluation: Initial  Physician Order: PT Eval and Treat    Presenting Problem:  (Acute on Chronic CHF,Dehydration,Generalized Weakness)  Reason for achieve prior level of function. Discussed Role of PT, PT eval findings, POC and DC recommendations with patient and RN. They understand well and are agreeable with PT recommendations.       DISCHARGE RECOMMENDATIONS  PT Discharge Recommendations: 24 hour PACEMAKER PLACEMENT      COLONOSCOPY,BIOPSY  2010,2005    VALVE REPLACEMENT      CABG         HOME SITUATION  Type of Home: House   Home Layout: One level                Lives With: Spouse  Drives: No  Patient Owned Equipment:  (RW,Home oxy,Raised toilet s a wheelchair)?: A Little   -   Need to walk in hospital room?: A Little   -   Climbing 3-5 steps with a railing?: A Little     AM-PAC Score:  Raw Score: 19   PT Approx Degree of Impairment Score: 41.77%   Standardized Score (AM-PAC Scale): 45.44   CMS Imtiaz

## 2017-04-28 NOTE — PROGRESS NOTES
[facility]  [facility]    Ashia Long Patient Status:  Inpatient    1935 MRN [id number]   Location [facility] 3W/SW Attending Charlee Forbes MD   Hosp Day # 4 PCP Dameon Luevano PCP- Lactulose    Ammonia improving    Plan:   Cont to monitor BMP and daily weight   CardioMems readings daily before noon - today 21    Results:     Lab Results  Component Value Date   WBC 8.0 04/28/2017   HGB 7.7* 04/28/2017   HCT 22.9* 04/28/2017   PLT

## 2017-04-29 NOTE — PROGRESS NOTES
[facility]    Cardiology Progress Note  [facility]    Micki Jose Patient Status:  Inpatient    1935 MRN [id number]   Location [facility] 3W/SW Attending Nasrin King MD   Hosp Day # 5 PCP 04/24/2017   AST 37 04/24/2017   ALT 21 04/24/2017   PTT 28.8 02/07/2017   INR 1.3* 02/07/2017   PT 17.4* 09/26/2016   T4F 1.69* 01/31/2017   TSH 5.71* 01/31/2017   LIP 29 01/31/2017   MG 1.8 03/29/2017   TROP 0.12* 04/24/2017   B12 932* 04/27/2017

## 2017-04-29 NOTE — PLAN OF CARE
Problem: Patient/Family Goals  Goal: Patient/Family Long Term Goal  Patient’s Long Term Goal: Manage heart failure, follow guidelines for sodium intake, fluid intake, and monitoring weight.   Interventions:  -Education regarding CHF  - Medications as ordere

## 2017-04-29 NOTE — PROGRESS NOTES
[facility] [facility]    Progress Note    Calebleena Crumer Patient Status:  Inpatient    1935 MRN [id number]   Location [facility] 3W/SW Attending Eric Ramos MD   [address] Day # 5 PCP Kristin Couch       Subjective:   Caleb Patel Inhalation Daily   • Clopidogrel Bisulfate  75 mg Oral Daily   • cyanocobalamin  1,000 mcg Oral Daily   • folic acid  1 mg Oral Daily   • Levothyroxine Sodium  112 mcg Oral Before breakfast   • Pantoprazole Sodium  20 mg Oral QAM AC       Current PRN Inpat significant mitral valve regurgitation  Improved  IVF stopped  dobutamine stopped  I's and O's  weights    on tele floor  bumex restarted  Cards following  baseline o2 3L at home  repeat xray  midodrine BID  Given zaroxlyn today    Anemia–patient has had e

## 2017-04-30 NOTE — PROGRESS NOTES
[facility]    Progress Note    Melissa Trujillo Patient Status:  Inpatient    1935 MRN [id number]   Location [facility] 3W/SW Attending Chris Singer MD   [facility] Day # 6 PCP Emily Rachel       Subjective:   Melissa Trujillo Inhalation Daily   • Clopidogrel Bisulfate  75 mg Oral Daily   • cyanocobalamin  1,000 mcg Oral Daily   • folic acid  1 mg Oral Daily   • Levothyroxine Sodium  112 mcg Oral Before breakfast   • Pantoprazole Sodium  20 mg Oral QAM AC       Current PRN Inpat mitral valve regurgitation  Improved  IVF stopped  dobutamine stopped  I's and O's  weights    on tele floor  bumex restarted  Cards following  baseline o2 3L at home  repeat xray  midodrine BID  Given zaroxlyn yesterday, checked Mems today, awaiting resul

## 2017-04-30 NOTE — PROGRESS NOTES
[facility]    Cardiology Progress Note  [facility]    [place] Patient Status:  Inpatient    1935 MRN [id number]   Location [facility] 3W/SW Attending Maxine Valentine MD   Hosp Day # 6 PCP 04/30/2017   CO2 36* 04/30/2017   * 04/30/2017   CA 8.8 04/30/2017   ALB 2.4* 04/24/2017   ALKPHO 79 04/24/2017   BILT 0.8 04/24/2017   TP 8.7* 04/24/2017   AST 37 04/24/2017   ALT 21 04/24/2017   PTT 28.8 02/07/2017   INR 1.3* 02/07/2017   PT 17.4*

## 2017-05-01 NOTE — PLAN OF CARE
Problem: Patient/Family Goals  Goal: Patient/Family Long Term Goal  Patient’s Long Term Goal: Manage heart failure, follow guidelines for sodium intake, fluid intake, and monitoring weight.   Interventions:  -Education regarding CHF  - Medications as ordere patients to use assistive/communication devices   Outcome: Progressing    Problem: CARDIOVASCULAR - ADULT  Goal: Maintains optimal cardiac output and hemodynamic stability  INTERVENTIONS:  - Monitor vital signs, rhythm, and trends  - Monitor for bleeding, patient’s volume status, including labs, urine output, blood pressure (other measures as available)  - Encourage oral intake as appropriate  - Instruct patient on fluid and nutrition restrictions as appropriate   Outcome: Progressing    Problem: SKIN/TISSU

## 2017-05-01 NOTE — PROGRESS NOTES
[facility]    Progress Note    Ninfa Pulido Patient Status:  Inpatient    1935 MRN [id number]   Location [facility] 3W/SW Attending Soila Christensen MD   [facility] Day # 7 PCP Susan Gilmore       Subjective:   Ninfa Pulido Inhalation Daily   • Clopidogrel Bisulfate  75 mg Oral Daily   • cyanocobalamin  1,000 mcg Oral Daily   • folic acid  1 mg Oral Daily   • Levothyroxine Sodium  112 mcg Oral Before breakfast   • Pantoprazole Sodium  20 mg Oral QAM AC       Current PRN Inpat significant mitral valve regurgitation  -Improved  -IVF stopped  -dobutamine stopped  -I's and O's  -weights    -on tele floor  -bumex restarted- likely decrease in am per cards  -Cards following  - baseline o2 3L at home  - repeat xray  - midodrine BID

## 2017-05-01 NOTE — PROGRESS NOTES
[facility]  Progress Note    Ashia Vasquez Patient Status:  Inpatient    1935 MRN [id number]   Location [facility] 3W/SW Attending Charlee Forbes MD   Hosp Day # 7 PCP AYANA AHMADI     Assessment:    1. CHF R>L.  Weight up thou 133 05/01/2017   K 3.8 05/01/2017   CL 88 05/01/2017   CO2 35 05/01/2017   BUN 41 05/01/2017   CREATSERUM 1.79 05/01/2017    05/01/2017   CA 8.9 05/01/2017          Lab Results  Component Value Date   TROP 0.12* 04/24/2017   TROP 0.14* 04/24/2017

## 2017-05-01 NOTE — DIETARY NOTE
ADULT NUTRITION RE-ASSESSMENT    Pt is at moderate nutrition risk. Pt meets malnutrition criteria.       CRITERIA FOR MALNUTRITION DIAGNOSIS:  Criteria for non-severe malnutrition diagnosis: acute illness/injury related to energy intake less than 75% for g care to new setting or provider: monitor plans    ADMITTING DIAGNOSIS: Dehydration [E86.0]  Weakness generalized [R53.1]  Acute renal insufficiency [N28.9]  Acute on chronic congestive heart failure, unspecified congestive heart failure type ([facility] [I50.9] acid  1 mg Oral Daily   • Levothyroxine Sodium  112 mcg Oral Before breakfast   • Pantoprazole Sodium  20 mg Oral QAM AC     LABS: reviewed     Recent Labs   04/29/17  0537 04/30/17  0525 05/01/17  0522   * 206* 107*   BUN 41* 37* 41*   CREATSERUM 1

## 2017-05-01 NOTE — PROGRESS NOTES
[address] [place] [address] Cardiology    Plan of Care Update    CardioMems readin/30: 57/18 (29)    : / (30)    Plan:  Goal PAD < 18 should correlate to CVP < 12

## 2017-05-01 NOTE — OCCUPATIONAL THERAPY NOTE
OCCUPATIONAL THERAPY TREATMENT NOTE - INPATIENT     Room Number: 321/321-A   Presenting Problem:  (hypotension with cardiomyopathy with macrocytic anemia )    Problem List  Principal Problem:    Acute on chronic congestive heart failure, unspecified conges training;Patient/Family education;Patient/Family training;Equipment eval/education; Neuromuscluar reeducation    SUBJECTIVE  \"This place is just making me worse\"    OBJECTIVE  Precautions: Cardiac    WEIGHT BEARING RESTRICTION  Weight Bearing Restriction: retrieval with supervision with RW  Comment: Patient declined participation        Goals  on:   Frequency:  3 x week     [address]  [address]  [id number]

## 2017-05-01 NOTE — PROGRESS NOTES
[facility]    Hematology/Oncology   Progress Note    Bekah Hamilton Patient Status:  Inpatient    1935 MRN [id number]   Location [facility] 3W/SW Attending Gurmeet Arthur MD   [address] Day # 7 [facility] [address] [facility] adequate.   Elevated ferritin noted indicating anemia of inflammation/chronic disease however this can also be seen in malignant processes  --Dysplastic marrow changes secondary to chronic hepatic dysfunction are possible based on elevated MCV  --marked girish

## 2017-05-01 NOTE — PROGRESS NOTES
[facility]    Progress Note    Artie Post Patient Status:  Inpatient    1935 MRN [id number]   Location [facility] 3W/SW Attending Erasmo Burrell MD   Hosp Day # 7 PCP AYANA AHMADI     Subjective:     Constitution chronic CHF  Cardiomyopathy with mitral regurgitation status post MVR porcine  -dobutamine off  -cards following  - cardiomems reading daily  - bumex   - continue slow diuresis per cards    Acute on chronic kidney disease  -monitor  -improving    Malnutrit

## 2017-05-02 PROBLEM — Z71.89 GOALS OF CARE, COUNSELING/DISCUSSION: Status: ACTIVE | Noted: 2017-01-01

## 2017-05-02 PROBLEM — Z71.89 ADVANCED CARE PLANNING/COUNSELING DISCUSSION: Status: ACTIVE | Noted: 2017-01-01

## 2017-05-02 NOTE — PROGRESS NOTES
[place] [facility] - [facility]    Progress Note    Niki Billing Patient Status:  Inpatient    1935 MRN [id number]   Location [facility] 3W/SW Attending Deandre Kumar MD   [facility] Day # 8 PCP Neeraj Green       Subjective:   Niki Billing mg Oral BID AC   • amiodarone HCl  100 mg Oral Nightly   • aspirin  81 mg Oral Daily   • Fluticasone Furoate-Vilanterol  1 puff Inhalation Daily   • Clopidogrel Bisulfate  75 mg Oral Daily   • cyanocobalamin  1,000 mcg Oral Daily   • folic acid  1 mg Oral 09/26/2016   INR 1.3* 02/07/2017   INR 1.5* 02/02/2017   INR 2.1* 11/03/2016     Imaging/EKG:           Assessment and Plan:   Hypotension–the patient has presumed intravascular depletion status post significant volume loss with diuresis     cardiorenal sy

## 2017-05-02 NOTE — CONSULTS
[address] Patient Status:  Inpatient    1935 MRN [id number]   Location [facility] 3W/SW Attending Yevgeniy Briscoe MD   Hosp Day # 8 PCP Amisha Hence     Date of Consul with wife Maureen Chau at the bedside. Patient is awake and alert and seems to be a reliable historian. Percy Siddiqi tells me that he is feeling \"tired and run down. \" He denies SOB presently although there is ROSAS which is relieved with rest. He is on continuous oxyge I discussed the benefits of palliative care to include help with symptom management, provide extra layer of support to patient/family, conduct GOC/wishes discussions, and assistance with advance care planning needs.  I discussed the differences between pall advance care planning prior to crisis. Sherly Deluna has not completed HCPOA paperwork in the past. He tells me that he would look to his wife to be his healthcare surrogate decision-maker.  I also discussed the risks versus benefits of life-sustaining treatments Challenged:no  Occupational History:retired    Substance History:  Smoking Status: former  Hx of Substance Use/Abuse: occasional ETOH, no illicits    Moravian/Cultural Information  Moravian Affiliation: Latter day  Ethnicity:   Cultural Belief/Die outpatient prescriptions on file. Review of Systems:  Pertinent items are noted in HPI.     Hematology:    Lab Results  Component Value Date   WBC 8.1 05/02/2017   HGB 7.8* 05/02/2017   HCT 23.2* 05/02/2017    05/02/2017       Coags:    Lab Result directives been discussed with patient or healthcare power of : Yes  Advance Directive: None     Healthcare Agent Appointed: No        Pre-existing DNR/DNI Order: Yes, notify physician for order  Describe Patient Wishes: DNR/DNI    Spiritual needs hospital.    Palliative Performance Scale 40%  -Biventricular heart failure s/p PPM and ICD placement, s/p Cardiomems device, severe right-sided valvular regurgitation, chronic kidney disease, chronic respiratory failure (on continuous home O2), multiple m

## 2017-05-02 NOTE — PROGRESS NOTES
[facility]  Progress Note    [facility] Patient Status:  Inpatient    1935 MRN [id number]   Location [facility] 3W/SW Attending Anali Lopes MD   Hosp Day # 8 PCP AYANA AHMADI     Assessment:      1. CHF R>L. Weight down. CREATSERUM 1.89 05/02/2017    05/02/2017   CA 8.8 05/02/2017   ALT 19 05/02/2017   AST 41 05/02/2017   ALB 2.1 05/02/2017          Lab Results  Component Value Date   TROP 0.12* 04/24/2017   TROP 0.14* 04/24/2017   TROP 0.32* 01/31/2017        Med

## 2017-05-02 NOTE — PHYSICAL THERAPY NOTE
Chart reviewed   RN approve participation   Pt received sound asleep  Wife is present   Per wife   Asking therapist not to wake for therapy   Per wife  Pt has been up in chair for hours today   Also met with palliative care team   Pt is very tired per wife

## 2017-05-02 NOTE — PROGRESS NOTES
[address]    CDI Prediction Tool Protocol    OVP (oral vancomycin prophylaxis) 125 mg PO BID is being started in this patient based on a score of 13.   This patient is currently at high risk for developing CDI due to his/her score being >/=13 po

## 2017-05-02 NOTE — PROGRESS NOTES
[facility]    Progress Note    Brooke Joy Patient Status:  Inpatient    1935 MRN [id number]   Location [facility] 3W/SW Attending Shabana Osei MD   Hosp Day # 8 PCP AYANA AHMADI     Subjective:     Constitutio CHF  Cardiomyopathy with mitral regurgitation status post MVR porcine  -dobutamine off  -cards following  - cardiomems reading daily  - bumex   - continue slow diuresis per cards    Acute on chronic kidney disease  -monitor  -improving    Malnutrition  - d

## 2017-05-02 NOTE — CONSULTS
Dr. Brayn Govea asked me to evaluate patient for palliative care needs. I spoke with BALDOMERO Roldan, who is in agreement with our service seeing the patient.  I introduced myself to  Leatha Rosmery and he asked me to return today at 11:30 am when his wife is expe

## 2017-05-02 NOTE — PROGRESS NOTES
05/02/17 1405   Clinical Encounter Type   Visited With Patient and family together   Routine Visit Introduction   Referral From Nurse  (Palliative Care)   Referral To    Patient Spiritual Encounters   Spiritual Assessment Completed 1   Adaptatio

## 2017-05-03 NOTE — PROGRESS NOTES
[facility]  Cardiology Progress Note    Ardath Real Patient Status:  Inpatient    1935 MRN [id number]   Location [facility] 3W/SW Attending Livier Maciel MD   Hosp Day # 9 PCP AYANA AHMADI       Assessment and Plan: CHF R>L kg)  05/02/17 0600 : 123 lb 14.4 oz (56.201 kg)  05/01/17 0600 : 127 lb 1.6 oz (57.652 kg)  04/30/17 0636 : 121 lb 14.4 oz (55.293 kg)  04/29/17 0403 : 125 lb 12.8 oz (57.063 kg)  04/28/17 0459 : 124 lb (56.246 kg)  04/27/17 0458 : 127 lb 12.8 oz (57.97 kg peru-castor oil (VENELEX) ointment  Topical BID PRN   lactulose (CHRONULAC) 10 GM/15ML solution 10 g 10 g Oral TID   dextrose injection 50 mL 50 mL Intravenous PRN   Glucose-Vitamin C (DEX-4) 4-0.006 g chewable tab 4 tablet 4 tablet Oral Q15 Min PRN   insu

## 2017-05-03 NOTE — CONSULTS
[facility]  Palliative Care Follow Up    Rhondaelise Ericksonkamryn Patient Status:  Inpatient    1935 MRN [id number]   Location [facility] 3W/SW Attending Andrés Hernandez MD   Hosp Day # 9 PCP Ori Guerrero     Date of Consult:  during my visit telling me that he has no regrets and has lived a full life. He also tells me that he has a wonderful family. He even made a joke telling me that he is now a \"vegetable but is unsure which one: a celery or a carrot? \"    With regard to the not complete HCPOA paperwork in the past; names his wife as his surrogate decision-maker.  -Plan to complete POLST from prior to discharge from the hospital.    Palliative Performance Scale 40%  -Biventricular heart failure s/p PPM and ICD placement, s/p c Oral, Daily  •  hydrALAzine HCl (APRESOLINE) tab 10 mg, 10 mg, Oral, Q8H LEXIE  •  spironolactone (ALDACTONE) tab 12.5 mg, 12.5 mg, Oral, Daily  •  vancomycin (FIRST-VANCOMYCIN 50) 50 MG/ML oral solution 125 mg, 125 mg, Oral, BID  •  isosorbide dinitrate (IS CA 9.0 05/03/2017   ALB 2.1* 05/02/2017   ALKPHO 76 05/02/2017   BILT 0.7 05/02/2017   TP 8.1 05/02/2017   AST 41 05/02/2017   ALT 19 05/02/2017   MG 1.8 03/29/2017   TROP 0.12* 04/24/2017       Imaging:          Palliative Performance Scale : 40%      P

## 2017-05-03 NOTE — PROGRESS NOTES
[facility]    Hematology/Oncology   Progress Note    Kathytiny Sanders Patient Status:  Inpatient    1935 MRN [id number]   Location [facility] 3W/SW Attending Keiry Arenas MD   [address] [facility] [address] [facility] [address] Day # 9 [facility] 36[address] [facility] adequate.   Elevated ferritin noted indicating anemia of inflammation/chronic disease however this can also be seen in malignant processes  --Dysplastic marrow changes secondary to chronic hepatic dysfunction are possible based on elevated MCV  --marked girish

## 2017-05-03 NOTE — PROGRESS NOTES
[place] [facility] - [facility]    Progress Note    Sienna Purcell Patient Status:  Inpatient    1935 MRN [id number]   Location [facility] 3W/SW Attending Cass Faria MD   [facility] Day # 9 PCP Divya Locke       Subjective:   Sienna Purcell HCl  2.5 mg Oral BID AC   • amiodarone HCl  100 mg Oral Nightly   • aspirin  81 mg Oral Daily   • Fluticasone Furoate-Vilanterol  1 puff Inhalation Daily   • Clopidogrel Bisulfate  75 mg Oral Daily   • cyanocobalamin  1,000 mcg Oral Daily   • folic acid  1 17.4* 09/26/2016   INR 1.3* 02/07/2017   INR 1.5* 02/02/2017   INR 2.1* 11/03/2016     Imaging/EKG:           Assessment and Plan:   Hypotension–the patient has presumed intravascular depletion status post significant volume loss with diuresis     cardiore

## 2017-05-03 NOTE — PHYSICAL THERAPY NOTE
PHYSICAL THERAPY TREATMENT NOTE - INPATIENT    Room Number: 321/321-A       Presenting Problem:  (Acute on Chronic CHF,Dehydration,Generalized Weakness)    Problem List  Principal Problem:    Acute on chronic congestive heart failure, unspecified congesti TOLERANCE  No shortness of breath    AM-PAC '6-Clicks' INPATIENT SHORT FORM - BASIC MOBILITY  How much difficulty does the patient currently have. ..  -   Turning over in bed (including adjusting bedclothes, sheets and blankets)?: None   -   Sitting down on activity/exercise instructions provided to patient in preparation for discharge.    Goal #5   Current Status

## 2017-05-03 NOTE — PROGRESS NOTES
[facility]    Progress Note    Sanchezethan Cooley Patient Status:  Inpatient    1935 MRN [id number]   Location [facility] 3W/SW Attending Susanne Camacho MD   Hosp Day # 9 PCP AYANA AHMADI     Subjective:     Constituti 5.2    Acute on chronic CHF  Cardiomyopathy with mitral regurgitation status post MVR porcine  -dobutamine off  -cards following  - cardiomems reading daily  - bumex   - continue slow diuresis per cards    Acute on chronic kidney disease  -monitor  -improv

## 2017-05-03 NOTE — PROGRESS NOTES
[facility]    Hematology/Oncology   Progress Note    Christian Lye Patient Status:  Inpatient    1935 MRN [id number]   Location [facility] 3W/SW Attending Amy Magana MD   [facility] Day # 8 [facility] [address] [facility] Hematology for macrocytic anemia with increased RDW and an increased protein gap with report of an abnormal light chain ratio. --iron b12 and folate adequate.   Elevated ferritin noted indicating anemia of inflammation/chronic disease however this can also

## 2017-05-03 NOTE — OCCUPATIONAL THERAPY NOTE
OCCUPATIONAL THERAPY TREATMENT NOTE - INPATIENT     Room Number: 321/321-A   Presenting Problem:  (hypotension with cardiomyopathy with macrocytic anemia )    Problem List  Principal Problem:    Acute on chronic congestive heart failure, unspecified conges Treatment Plan: Balance activities; Energy conservation/work simplification techniques;ADL training;Functional transfer training;UE strengthening/ROM; Endurance training;Patient/Family education;Patient/Family training    SUBJECTIVE  \"Don't make me walk\" standing    Patient will complete item retrieval with supervision with RW  Comment: NT        Goals  on:   Frequency:  3 x week     [address]  [address]  [id number]

## 2017-05-03 NOTE — PROGRESS NOTES
[address] [place] [address] Cardiology    Plan of Care Update      Cardio mems:   5/3/2017: 37/12 (20)  Plan of care per Dr Selena Templeton note.

## 2017-05-04 NOTE — CONSULTS
[facility]  Palliative Care Follow Up    Eugenie Dalton Patient Status:  Inpatient    1935 MRN [id number]   Location [facility] 3W/SW Attending Gretel Benavidez MD   Hosp Day # 10 PCP Destinee Joy     Date of Consult: 0 gastropathy      Macrocytic anemia      Ascites      Anemia due to GI blood loss      Malnutrition (HCC)      Elevated serum immunoglobulin free light chains      Multiple myeloma (HCC)      Pain  -Continue Tylenol #3 1 pill PO Q 4 PRN pain      Goals of c rate 18, height 5' 6\" (1.676 m), weight 123 lb 6.4 oz (55.974 kg), SpO2 97 %. Body mass index is 19.93 kg/(m^2).   Present Level of pain: denies    Intake and Output:    Intake/Output Summary (Last 24 hours) at 05/04/17 1233  Last data filed at 05/04/17 0 Fluticasone Furoate-Vilanterol (BREO ELLIPTA) 100-25 MCG/INH inhaler 1 puff, 1 puff, Inhalation, Daily  •  Clopidogrel Bisulfate (PLAVIX) tab 75 mg, 75 mg, Oral, Daily  •  Vitamin B-12 (VITAMIN B12) tab 1,000 mcg, 1,000 mcg, Oral, Daily  •  docusate sodium Healthcare Agent Appointed: No        Pre-existing DNR/DNI Order: Yes, notify physician for order  Describe Patient Wishes: DNR/DNI    Hospitalization:  Limited treatment: transfer to hospital but no endotracheal intubation or long-term life support.   Pr

## 2017-05-04 NOTE — PROGRESS NOTES
[facility]  Progress Note    Denise Dennis Patient Status:  Inpatient    1935 MRN [id number]   Location [facility] 3W/SW Attending Graciela Barker., MD   Hosp Day # 10 PCP Isabell Vinson     Assessment:      1. CHF R>L.  Weight sta 04/24/2017   TROP 0.14* 04/24/2017   TROP 0.32* 01/31/2017        Medications:    • bumetanide  1 mg Oral Daily   • hydrALAzine HCl  10 mg Oral Q8H [address]   • spironolactone  12.5 mg Oral Daily   • vancomycin  125 mg Oral BID   • isosorbide dinitrate  5 mg Or

## 2017-05-04 NOTE — DISCHARGE PLANNING
5/4/17 CM Discharge planning / ELISA Zhang 78 orders  Pt is current with [facility]. advised Ceferino Mccall RN [facility] resume orders and palliative care orders in chart.    Nishant Lacy X [phone]

## 2017-05-04 NOTE — PROGRESS NOTES
Patient was seen and examined. No complaints. Wants to go home. Discussed with cardiology. Ok to d/c home. Discussed discharge plans with patient and wife at bedside.    Patient to be discharged home today with home health care RN and PT/OT, and palliati

## 2017-05-04 NOTE — HOME CARE LIAISON
ORDERS RECEIVED TO [facility] HOME HEALTH, (NURSING, PT, OT), WITH THE ADDITION OF  AND HOME PALLIATIVE APN. MET WITH PATIENT AND HIS WIFE TO DISCUSS PLANS. BOTH IN AGREEMENT WITH CURRENT PLANS.  PROVIDED PHONE [address]

## 2017-05-04 NOTE — PROGRESS NOTES
05/04/17 1014   Clinical Encounter Type   Visited With Patient   Routine Visit Follow-up   Referral From Nurse  (Palliative Care)   Referral To    Patient Spiritual Encounters   Spiritual Assessment Completed 1   Spiritual Interventions active l

## 2017-05-04 NOTE — DISCHARGE SUMMARY
[facility]    Discharge Summary    Niki Billing Patient Status:  Inpatient    1935 MRN [id number]   Location [facility] 3W/SW Attending Kenyatta Reyna MD   Hosp Day # 10 PCP Neeraj Green     Date of Admission:  discussion      Reason for Admission: Hypotension    Physical Exam:    05/04/17  1426   BP: 91/45   Pulse: 63   Temp: 36.6   Resp: 18     GENERAL:  The patient appeared to be in no distress and was comfortable.   SKIN:  Warm and hydrated  PSYCHIATRIC: Calm mg daily  Cards was consulted  baseline o2 3L at home  Cont midodrine BID  Deemed stable for dc from cardiology standpoint  Weight stable at this time    Anemia–patient has had extensive evaluation recently  Findings c/w myeloma per [facility]  Not a good chemo mouth TID (Nitrates). Quantity:  90 tablet   Refills:  5       lactulose 10 GM/15ML Soln   Last time this was given:  10 g on 5/4/2017  9:21 AM   Commonly known as:  CHRONULAC        Take 15 mL (10 g total) by mouth 3 (three) times daily.     Stop taking cyanocobalamin 500 MCG Tabs   Last time this was given:  1,000 mcg on 5/4/2017  9:21 AM        Take 2 tablets (1,000 mcg total) by mouth daily.     Refills:  0       docusate sodium 100 MG Caps   Last time this was given:  100 mg on 5/2/2017  1:36 PM   Comm Tabs  - ALPRAZolam 0.25 MG Tabs  - Amiodarone HCl 100 MG Tabs  - bumetanide 1 MG Tabs  - hydrALAzine HCl 10 MG Tabs  - isosorbide dinitrate 5 MG Tabs  - spironolactone 25 MG Tabs        Follow-up With Details Why Contact Info   Ashkan Lord In 1 week  183

## 2017-05-04 NOTE — ED PROVIDER NOTES
Patient Seen in: [facility] 3w/sw    History   Patient presents with:  Dyspnea GRACIE SOB (respiratory)    Stated Complaint: Dehydration    HPI    Patient presents the emergency department today complaining of generalized progressive weakness and crystal Oral Cap,  Take 100 mg by mouth 2 (two) times daily as needed for constipation.    omeprazole (PRILOSEC) 20 MG Oral Capsule Delayed Release,  Take 1 capsule (20 mg total) by mouth every morning before breakfast.   carvedilol 3.125 MG Oral Tab,  Take 1 table heard.  Pulmonary/Chest: Effort normal and breath sounds normal. No respiratory distress. Scattered chronic bibasilar rales. Abdominal: Soft. He exhibits no distension. There is no tenderness. Musculoskeletal: Normal range of motion.  He exhibits no t limits   ARTERIAL BLOOD GAS - Abnormal; Notable for the following:     ABG pH 7.47 (*)     ABG pCO2 47 (*)     ABG PO2 69 (*)     ABG HCO3 33.1 (*)     Blood Gas Base Excess 8.7 (*)     Oxygen Content 11.3 (*)     Blood Gas P-50 20 (*)     All other compon Albumin/Globulin Ratio 0.48 (*)     All other components within normal limits   IMMUNOGLOBULIN FREE LT CHAINS BLOOD - Abnormal; Notable for the following:     Kappa Free Light Chain 4.416 (*)     Lambda Free Light Chain 81.218 (*)     Osmond/Lambda Flc R BUN 53 (*)     Creatinine 1.89 (*)     Albumin 2.1 (*)     Globulin 6.0 (*)     A/G Ratio 0.4 (*)     BUN/CREA Ratio 28.0 (*)     GFR, Non- 34 (*)     GFR, -American 42 (*)     All other components within normal limits   BASIC METABO All other components within normal limits   POCT GLUCOSE - Abnormal; Notable for the following:     POC Glucose  117 (*)     All other components within normal limits   POCT GLUCOSE - Abnormal; Notable for the following:     POC Glucose  315 (*)     All Notable for the following:     POC Glucose  273 (*)     All other components within normal limits   POCT GLUCOSE - Abnormal; Notable for the following:     POC Glucose  121 (*)     All other components within normal limits   POCT GLUCOSE - Abnormal; Notabl for the following:     RBC 2.32 (*)     HGB 8.2 (*)     HCT 24.6 (*)     .0 (*)     MCH 35.3 (*)     RDW 17.8 (*)     MPV 6.5 (*)     Monocyte Absolute 1.2 (*)     All other components within normal limits   CBC W/ DIFFERENTIAL - Abnormal; Notable f All other components within normal limits   DIGOXIN (LANOXIN) - Normal   LACTIC ACID, PLASMA - Normal   POTASSIUM - Normal   HEMOGLOBIN A1C - Normal    Narrative:     Method HPLC           Expected Results           Fasting Plasma Glucose (mg/dL)     HbA1c CBC W/ DIFFERENTIAL[[id number]]          Abnormal            Final result                 Please view results for these tests on the individual orders. CBC WITH DIFFERENTIAL WITH PLATELET    Narrative:      The following orders were create DIFFERENTIAL[[id number]]          Abnormal            Final result                 Please view results for these tests on the individual orders. CBC WITH DIFFERENTIAL WITH PLATELET    Narrative:      The following orders were created for panel order CBC WI In process                   Please view results for these tests on the individual orders.    RAINBOW DRAW BLUE   RAINBOW DRAW GOLD   RAINBOW DRAW LAVENDER   RAINBOW DRAW LIGHT GREEN   RAINBOW DRAW DARK GREEN   RAINBOW DRAW LAVENDER TALL (BNP)   URINE CU Acute on chronic congestive heart failure (HCC) I50.9 Unknown Unknown    Acute renal failure (ARF) ([facility] [address] N17.9 4/24/2017 Yes    Acute renal insufficiency N28.9 4/24/2017 Unknown    Advanced care planning/counseling discussion Z71.89 5/2/2017 Unknown    Ane

## 2017-05-15 NOTE — TELEPHONE ENCOUNTER
Spoke with patient's wife. Weight is stable 120 lbs, up only 1 lbs since hospital discharge. Breathing ok. Taking 1 mg bumex daily.  Would like to transmit numbers and continue w/treatment, however does not want to be hospitalized any further and is refusin

## 2017-05-24 NOTE — TELEPHONE ENCOUNTER
Patient's wife Danika Souza calling. She reports home health nurse seeing patient today and recommends patient follow-up in the heart failure clinic in the next 1-2 days. .  Offered patient tomorrow Thursday 525, patient will come at 10:30 AM.

## 2017-05-25 NOTE — PROGRESS NOTES
[address] [facility]        Drew Ventura is a 81year old male who presents to clinic for management of acute on chronic systolic heart failure and generalized anasarca.      The patient came into the emergency department on 1/31/17 for evaluation bec failure  CAD  CABG  Ascites  Chronic anasarca  AICD - Vtach?   HTN  DM    New York Heart Association Class: III      Review of Systems:  Constitutional: negative for chills, fatigue, fevers and malaise  Respiratory: positive for dyspnea on exertion  Cardiov non-tender; bowel sounds normal; no masses,  no organomegaly  Extremities: Trace-1+ shin BLE edema  Pulses: 2+ and symmetric  Neurologic: Grossly normal    Cardiographics:  Echocardiogram: 2/1/17  1. Left ventricle: The cavity size was mildly dilated.  Syst lightheadedness, chest pain, and palpitations. ROSAS at baseline. Today in clinic, his weight is up 2 lbs from last visit. Last hospital documented weight 123 lbs on 5/4. Home weight up 2 lbs since discharge.  Cardiomems numbers have been stable (22) since

## 2017-05-25 NOTE — PATIENT INSTRUCTIONS
Today and tomorrow please take bumex 2 mg (full pill). We will talk after your CardioMems reading on Friday. Continue tracking daily weights. Call with weight gain of 3 lbs overnight or concerning symptoms.    [phone]    32-64 oz fluid restriction

## 2017-05-26 ENCOUNTER — PRIOR ORIGINAL RECORDS (OUTPATIENT)
Dept: OTHER | Age: 82
End: 2017-05-26

## 2017-06-08 ENCOUNTER — PRIOR ORIGINAL RECORDS (OUTPATIENT)
Dept: OTHER | Age: 82
End: 2017-06-08

## 2017-06-09 ENCOUNTER — PRIOR ORIGINAL RECORDS (OUTPATIENT)
Dept: OTHER | Age: 82
End: 2017-06-09

## 2017-06-09 NOTE — TELEPHONE ENCOUNTER
CardioMems update from 6/8: Spoke with patient's wife in the am. She reports that at the direction of [facility] nurse, they were to increase bumex to 4 mg daily (previously taking 1 mg daily). He has been taking this for the last several days.  States patient's we

## 2017-06-13 NOTE — TELEPHONE ENCOUNTER
Spoke with . Citlaly Rodolfo, pt is taking bumex 2 mg bid  But did not take metolazone dose on Sunday, his weight is up and having more swelling. He wasn't feeling good yesterday, but better today. He will take metolazone 5 mg tab today.  Pt checking cardiomems

## 2017-06-14 PROBLEM — N18.30 CKD (CHRONIC KIDNEY DISEASE) STAGE 3, GFR 30-59 ML/MIN (HCC): Chronic | Status: ACTIVE | Noted: 2017-01-01

## 2017-06-14 NOTE — PROGRESS NOTES
[address] [facility]        Tom Billingsley is a 81year old male who presents to clinic for management of acute on chronic systolic heart failure and generalized anasarca. Last clinic visit on 5-25-17.      Admitted on 1/31/17 for oozing fluid from his Vtach?  HTN  DM    New York Heart Association Class: III    Subjective:   Here today with his wife. Still feeling fatigued and dyspnea on exertion walking 20 feet.   His weight is near his baseline at home and 135, was 133 yesterday he has continued bilate prescriptions:   •  metolazone 5 MG Oral Tab, Take 2.5 mg by mouth every 7 days. Weekly on tuesdays, Disp: , Rfl:   •  bumetanide 1 MG Oral Tab, Take 2 tablets (2 mg total) by mouth 2 (two) times daily. , Disp: 30 tablet, Rfl: 5  •  Acetaminophen-Codeine #3 lungs 2 (two) times daily. , Disp: , Rfl:   •  Levothyroxine Sodium 112 MCG Oral Tab, Take 112 mcg by mouth before breakfast., Disp: , Rfl:   •  Saccharomyces boulardii 250 MG Oral Powd Pack, Take 1 mg by mouth 2 (two) times daily. , Disp: , Rfl:       BP 12 Assessment:  Acute on chronic systolic heart failure-NYHA class 3, EF 35-40%.    - on Bumex 2 mg BID  - Metolazone 2.5 mg weekly, first dose yesterday 6-14-17  - Coreg 3.125 BID  - No ace/arb due to renal function  -on hydralazine 10 mg tid with isosorb on MWF. Follow up in the heart failure clinic on June 21 st.       Patient has been discharged for over one month. Was last seen on 5- 25-17.  Was initially reluctant to come back to the clinic because he states he was afraid I would recommend admission aga

## 2017-06-14 NOTE — PATIENT INSTRUCTIONS
Increase bumex to 2 mg one tablet 3 times a day, morning , lunchtime and dinnertime    Continue all your other same medications    Take metolazone 2.5 mg tab once a week on Tuesdays     Blood test for basic metabolic panel and cbc on Friday 6-16-17 with lupe

## 2017-06-16 ENCOUNTER — PRIOR ORIGINAL RECORDS (OUTPATIENT)
Dept: OTHER | Age: 82
End: 2017-06-16

## 2017-06-16 NOTE — TELEPHONE ENCOUNTER
Spoke with Dr. Linden Parada about patient's lab results. Hemoglobin 6.6, BUN 53, creatinine 2.83, sodium 135, potassium 4.5.   She spoke with patient and wife earlier today and he still feeling weak but not having significant lightheadedness or dizziness or wors

## 2017-06-19 PROBLEM — N18.9 ACUTE RENAL FAILURE SUPERIMPOSED ON CHRONIC KIDNEY DISEASE, UNSPECIFIED CKD STAGE, UNSPECIFIED ACUTE RENAL FAILURE TYPE: Status: ACTIVE | Noted: 2017-01-01

## 2017-06-19 PROBLEM — E83.52 HYPERCALCEMIA: Status: ACTIVE | Noted: 2017-01-01

## 2017-06-19 PROBLEM — Z51.5 END OF LIFE CARE: Status: ACTIVE | Noted: 2017-01-01

## 2017-06-19 PROBLEM — N18.9 ACUTE RENAL FAILURE SUPERIMPOSED ON CHRONIC KIDNEY DISEASE, UNSPECIFIED CKD STAGE, UNSPECIFIED ACUTE RENAL FAILURE TYPE (HCC): Status: ACTIVE | Noted: 2017-01-01

## 2017-06-19 PROBLEM — N17.9 ACUTE RENAL FAILURE SUPERIMPOSED ON CHRONIC KIDNEY DISEASE, UNSPECIFIED CKD STAGE, UNSPECIFIED ACUTE RENAL FAILURE TYPE (HCC): Status: ACTIVE | Noted: 2017-01-01

## 2017-06-19 PROBLEM — J18.9 PNEUMONIA OF BOTH LUNGS DUE TO INFECTIOUS ORGANISM, UNSPECIFIED PART OF LUNG: Status: ACTIVE | Noted: 2017-01-01

## 2017-06-19 PROBLEM — N17.9 ACUTE RENAL FAILURE SUPERIMPOSED ON CHRONIC KIDNEY DISEASE, UNSPECIFIED CKD STAGE, UNSPECIFIED ACUTE RENAL FAILURE TYPE: Status: ACTIVE | Noted: 2017-01-01

## 2017-06-19 LAB
BUN: 53 MG/DL
CALCIUM: 12.2 MG/DL
CHLORIDE: 94 MEQ/L
CREATININE, SERUM: 2.83 MG/DL
GLUCOSE: 79 MG/DL
HEMATOCRIT: 22.1 %
HEMOGLOBIN: 6.6 G/DL
PLATELETS: 82 K/UL
POTASSIUM, SERUM: 4.5 MEQ/L
RED BLOOD COUNT: 1.89 X 10-6/U
SODIUM: 135 MEQ/L
WHITE BLOOD COUNT: 12.5 X 10-3/U

## 2017-06-19 NOTE — ED INITIAL ASSESSMENT (HPI)
Pt received via EMS with c/o vomiting. Pt had recent dx of bone CA. Crackles noted through all lobes.

## 2017-06-19 NOTE — PROGRESS NOTES
[facility]    CDI Prediction Tool Protocol (Vancomycin Initiated)    OVP (oral vancomycin prophylaxis) 125 mg PO BID is being started in this patient based on a score of 15.   This patient is currently at high risk for developing CDI due to his/h

## 2017-06-19 NOTE — HISTORICAL OFFICE NOTE
[facility]  [phone]  : 1935  ACCOUNT: [id number]  PCP: 00  CARDIOLOGIST: Dr. Christian Dunne: [facility]  Admitted: 2017  Discharged: 2017    DISCHARGE SUMMARY    HOSPITAL COURSE: Mr. Malu Evans was admitted to 3. Status post CardioMEMS implant. 4. Renal insufficiency. 5. Severe TR. 6. Status post bioprosthetic mitral valve replacement. 7. Macrocystic anemia, lambda light chain elevation.       HOSPITAL COURSE:  Yamilet Herron is an [de-identified]year old male with DISCHARGE MEDICATIONS: amiodarone 100 mg qhs, aspirin 81 mg daily, Bumex 2 mg tid, Coreg 3.125 mg bid, Plavix 75 mg daily, digoxin 0.125 mg every other day, metolazone 2.5 mg on Monday, Wednesday and Friday, spironolactone 25 mg daily.  Please see discharge 28-year-old male, with history of severe ischemic cardiomyopathy, EF 35%-40%, significant right ventricular dysfunction, severe tricuspid regurgitation, noted chronic anasarca, lower extremity edema, recurrent ascites; who has had multiple hospitalizations SOCIAL HISTORY: SMOKING: Never used tobacco. Denies smoking. CAFFEINE: 1 beverage daily. ALCOHOL: drinks rarely. EXERCISE: walks and not regularly 9/08. DIET: low fat,low cholesterol. MARITAL STATUS: . EDUCATION: high school graduate.  OCCUPATION: [employer]-year-old male with history of ischemic cardiomyopathy, prior bypass surgery, mitral valve repair, subsequent tissue mitral valve replacement, EF 35%-40%, dual-chamber ICD, carotid endarterectomy; who has had frequent heart failure hospi 04/10/17 *Digoxin              125MCG    One tablet every other day               04/10/17 Amiodarone HCl        200MG     One tablet 1/2 daily                     04/10/17 Aspirin               81MG      1 TABLET DAILY.                           04/10/17 B

## 2017-06-19 NOTE — HOSPICE RN NOTE
Hospice inpatient admission    Writer met with patients wife to discuss hospice philosophy, goals, and proposed plan of care. Patients wife is agreeable to hospice and has signed consents.   Patient will be admitted with hospice diagnosis of multiple myelo

## 2017-06-19 NOTE — CONSULTS
[address] [place] [address] Patient Status:  Inpatient    1935 MRN [id number]   Location [facility] 2W/SW Attending Bobby Huertas MD   Hosp Day # 1 PCP Olamide Ziegler     Date of Consult: room per his family) Patient appears overall cachectic, lethargic and mildly tachypneic. Breathing appears mildly labored currently on 02 NC with no current symptoms of dypsnea. Patient denies any current pain issues.  Appetite has been very poor, current B life-prolonging medications as this prolongs the inevitable. His family is now on board with starting hospice services.  During my evaluation, Lory Lefort APN revisited his room along with the hospice liaison and a . The family extended their gratitude in gloria acetaminophen (TYLENOL) tab 650 mg, 650 mg, Oral, Q6H PRN  •  Normal Saline Flush 0.9 % injection 10 mL, 10 mL, Intravenous, PRN  •  albuterol sulfate (VENTOLIN) (2.5 MG/3ML) 0.083% nebulizer solution 2.5 mg, 2.5 mg, Nebulization, TID  •  0.9%  NaCl infusi 06/19/2017   PLT 92* 06/19/2017       Coags:  Lab Results  Component Value Date   PT 17.4* 09/26/2016   INR 1.3* 02/07/2017   PTT 28.8 02/07/2017       Chemistry:  Lab Results  Component Value Date   CREATSERUM 4.52* 06/19/2017   BUN 79* 06/19/2017   NA 13 Healthcare Directive: Living will  Healthcare Agent Appointed: No        Pre-existing DNR/DNI Order: Yes, notify physician for order  Describe Patient Wishes: NO CHEMO, NO DIALYSIS, PREFERS NO RE-HOSPITALIZATION    Spiritual needs addressed: Referral place Devora Pereira M.D. [id number]  6/19/2017  3:52 PM

## 2017-06-19 NOTE — PROGRESS NOTES
Pulmonary/Critical Care Follow Up Note    HPI:   Drew Ventura is a 81year old male with Patient presents with:  Nausea/Vomiting/Diarrhea (gastrointestinal)      PCP Bethany Crum  Admission Attending Nickie Hanley MD    Hospital Day #1    C/o some pa Daily  •  Clopidogrel Bisulfate (PLAVIX) tab 75 mg, 75 mg, Oral, Daily  •  Vitamin B-12 (VITAMIN B12) tab 1,000 mcg, 1,000 mcg, Oral, Daily  •  docusate sodium (COLACE) cap 100 mg, 100 mg, Oral, BID PRN  •  folic acid (FOLVITE) tab 1 mg, 1 mg, Oral, Daily anorexia  multifactorial  Suspect aspiration, CHF, ARF all contributing  Also MM and may have been smoldering from since last year  Not better  Plan see below    Aspiration PNA  Still gurgling on exam  Abnl CXR  Plan     Iv abx              Neb    VT  High

## 2017-06-19 NOTE — PLAN OF CARE
RESPIRATORY - ADULT    • Achieves optimal ventilation and oxygenation Not Progressing          PAIN - ADULT    • Verbalizes/displays adequate comfort level or patient's stated pain goal Progressing        Patient Centered Care    • Patient preferences are

## 2017-06-19 NOTE — DIETARY NOTE
NUTRITION NOTE:  Received RN consult for decline in po intake. Pt is now referred to hospice- not appropriate for full nutrition assessment. Pt is NPO. SLP laic earlier today noted.  Will follow and provide supporti

## 2017-06-19 NOTE — PROGRESS NOTES
06/19/17 0100   Provider Notification   Reason for Communication New consult   Test/Procedure Name [address]   Provider Name   [id number] Juliana Ambriz   Method of Communication Face to face   Response At bedside  (REQUEST D/C FLUID, NO REPEAT TROPONIN, CONSULT HOSPICE)

## 2017-06-19 NOTE — PROGRESS NOTES
Talked to wife and family about DNR status and the prospect of the AICD firing again. They are interested in having the AICD function turned off and would like hospice to revisit them.  to be notified to arrange annointing of the sick.   Discussed

## 2017-06-19 NOTE — ICD/PM
Pt entering hospice, patient and family wishes are for no ICD discharges. ICD  Detections and therapies deactivated. Pt had some non-sustained VT last night, but no discharges. Device had been followed at [facility]. Pt alerts also deactivated.

## 2017-06-19 NOTE — CONSULTS
[facility]  Cardiology Consultation    Ardath Real Patient Status:  Inpatient    1935 MRN [id number]   Location [facility] 2W/SW Attending Owen Cummings MD   Hosp Day # 1 PCP Miguel Zavala     Reason for Consultation:  Acute on REPLACEMENT      Comment MVR    CABG       No family history on file. reports that he has quit smoking. His smoking use included Cigarettes. He does not have any smokeless tobacco history on file.  He reports that he drinks about 1.2 oz of alcohol per wee systems was negative if not otherwise mention in above HPI.     BP 87/50 mmHg  Pulse 89  Temp(Src) 98 °F (36.7 °C) (Temporal)  Resp 24  Ht 5' 6\" (1.676 m)  Wt 140 lb (63.504 kg)  BMI 22.61 kg/m2  SpO2 97%  Temp (24hrs), Av °F (36.7 °C), Min:98 °F (36.7

## 2017-06-19 NOTE — ED PROVIDER NOTES
Patient Seen in: [facility] AND [facility]    History   Patient presents with:  Nausea/Vomiting/Diarrhea (gastrointestinal)    Stated Complaint: emesis    HPI    25-year-old male with history of hypertension, diabetes, hyperlipidemia, conges tablets (2 mg total) by mouth 2 (two) times daily. Acetaminophen-Codeine #3 300-30 MG Oral Tab,  Take 1 tablet by mouth every 4 (four) hours as needed. ALPRAZolam 0.25 MG Oral Tab,  Take 1 tablet (0.25 mg total) by mouth nightly as needed for Anxiety. complaint: emesis  Other systems are as noted in HPI. Constitutional and vital signs reviewed. All other systems reviewed and negative except as noted above. PSFH elements reviewed from today and agreed except as otherwise stated in HPI.     Physic NATRIUERTIC PEPTIDE) - Abnormal; Notable for the following:     Beta Natriuretic Peptide 3132 (*)     All other components within normal limits   CBC W/ DIFFERENTIAL - Abnormal; Notable for the following:     WBC 12.9 (*)     RBC 2.04 (*)     HGB 7.4 (*) IMPRESSION:    Comparison: 4/27/2017    Perihilar and lung base predominantly interstitial and patchy air space opacities, suspect worsening mild to moderate pulmonary edema. Cannot exclude superimposed pneumonia.    Small bilateral pleural effusions,

## 2017-06-19 NOTE — H&P
Pulmonary/Critical Care Admission Note    HPI:   Sienna Purcell is a 81year old male with Patient presents with:  Nausea/Vomiting/Diarrhea (gastrointestinal)    Divya Locke    Pt is a 80 yo with recent dx of MM, malignant ascites, CHF, COPD, CAD, Afib daily. Disp: 30 tablet Rfl: 5   Acetaminophen-Codeine #3 300-30 MG Oral Tab Take 1 tablet by mouth every 4 (four) hours as needed. Disp: 30 tablet Rfl: 0   ALPRAZolam 0.25 MG Oral Tab Take 1 tablet (0.25 mg total) by mouth nightly as needed for Anxiety.  Clifford Khanna Medications    [COMPLETED] ondansetron HCl (ZOFRAN) injection 4 mg 4 mg Intravenous Once   [COMPLETED] 0.9%  NaCl infusion  Intravenous Once   Piperacillin Sod-Tazobactam So (ZOSYN) 3.375 g in dextrose 5 % 100 mL IVPB 3.375 g Intravenous Once           All now   Will need diuresis    Cards consult    MM  + lambda light chain  Not candidate for therapy  Plan transfuse for hb < 7 or <8 with sx    Avni HTN gastropathy  + serum-ascites gradient  Low fe anemia  GIB  ? 2/2 cirrhotic cardiomyopathy  Plan follow

## 2017-06-19 NOTE — CM/SW NOTE
CTL update. Per ALBER Mascorro, patient is admitted to inpatient Hospice and will transfer to Room 450.   [facility] X [id number]

## 2017-06-19 NOTE — PROGRESS NOTES
Patient seen and examined. Note dictated. Multiple myeloma with progression of disease hypercalcemia and encephalopathy. Will give dose of pamidronate over 6 hours given renal insufficiency. Otherwise the patient is being set up for hospice.   Please ca

## 2017-06-19 NOTE — SLP NOTE
ADULT SWALLOWING EVALUATION    ASSESSMENT & PLAN   ASSESSMENT  Pt seen sitting upright in bed for all PO trials. Limited intake observed given patient's lethargic state and need for frequent verbal cues for participation.  Pt with good oral acceptance and b failure type    Pneumonia of both lungs due to infectious organism, unspecified part of lung    Hypercalcemia    Acute renal failure superimposed on chronic kidney disease ([facility] [address]    Pneumonia of both lungs due to infectious organism    Counseling regarding Motion: Reduced right facial;Reduced left facial;Reduced right lingual;Reduced left lingual  Rate of Motion: Reduced    Voice Quality: Breathy;Weak  Respiratory Status: Nasal cannula  Consistencies Trialed: Nectar thick liquids;Puree  Method of Presentatio you have any questions, please contact Ely Woodall

## 2017-06-19 NOTE — DISCHARGE PLANNING
MARYBEL following up on d/c planning for the patient. Order received for hospice and [facility]. MARYBEL spoke wit [facility] who will follow-up.       Adebayo Chau [facility]  [id number]

## 2017-06-19 NOTE — CONSULTS
[facility]    PATIENT'S NAME: Derrick Ishaan   ATTENDING PHYSICIAN: Nic Major MD   CONSULTING PHYSICIAN: Hyun Agustin.  MD Cresencio   PATIENT ACCOUNT#:   [id number]    LOCATION:  [address] [place] [address] #:   [id number]       DATE OF BIRTH:  0 fully.  Further history and review of systems obtained per family.      PAST MEDICAL HISTORY:  Lambda light chain myeloma diagnosed as per above, ischemic cardiomyopathy status post coronary artery bypass grafting, status post mitral valve replacement for m calcium greater than 12.8 with an albumin of 2.4. Imaging:  Portable chest x-ray from 06/18/2017 shows increasing heart size and increasing congestive heart failure.     ASSESSMENT AND PLAN:  The patient is an 81-year-old male with a history of multiple

## 2017-06-19 NOTE — PROGRESS NOTES
PATIENT NOT ABLE TO VOID SINCE ADMIT- PATIENT AND WIFE REFUSING ODONNELL CATHETER INSERTION AT THIS TIME, DR. Mayra Burnett.

## 2017-06-19 NOTE — CONSULTS
[facility]    Report of Consultation    Date of Admission:  6/18/2017  Date of Consult:  6/19/2017   Reason for Consultation:     KATHARINE and hyperkalemia     History of Present Illness:     Keily Batista is a 81 yrs old male with pmh of DM reviewed. No pertinent family history. Social History  Patient Guardian Status:  Not on file.     Other Topics            Concern    None on file    Social History Narrative    None on file            Current Medications:    Current Facility-Administered 15 GM/60ML suspension 15 g 15 g Oral Once       Allergies    Adhesive Tape (Carolyne*    Rash    Review of Systems:     Unable to obtain - confused and non communicative     Physical Exam:   Height: 5' 6\" (167.6 cm) (06/18 2005)  Weight: 140 lb (63.504 kg) (0 ----------  INR   Date Value Ref Range Status   02/07/2017 1.3* 0.9-1.2 Final   Comment:     Only the INR (not the Protime value) should be utilized for   the monitoring of oral anticoagulant therapy.      Recommended therapeutic ranges for anticoagulant

## 2017-06-19 NOTE — RESPIRATORY THERAPY NOTE
Attempted IS with patient at 0530, but patient was unable to stay consistent with instructions. EZPAP ordered for patient as the next step.

## 2017-06-20 NOTE — PLAN OF CARE
Pt  at [address]. Resusitation not attempted as pt was DNR.     Time of Death 66    Family Notified Yes  Name and Relation Wife    MD: Ileana Johnston and [facility] Notified: Yes, Gee Parkinson, reference number [id number]     contacted if applicable n/a

## 2017-06-20 NOTE — PLAN OF CARE
COPING    • Pt/Family able to verbalize concerns and demonstrate effective coping strategies Progressing        DEATH & DYING    • Pt/Family communicate acceptance of impending death and feel psychological comfort and peace Veda

## 2017-06-20 NOTE — CM/SW NOTE
MSW eval/death call 6/20/17. MSW met with pt's family to provide support at pt's bedside. The family is grieving appropriately at low level as they are well supported by each other and the community. The family has the [facility] selection covered.   Yamileth Hanna

## 2017-06-21 ENCOUNTER — TELEPHONE (OUTPATIENT)
Dept: PULMONOLOGY | Facility: CLINIC | Age: 82
End: 2017-06-21

## 2017-06-21 NOTE — DISCHARGE SUMMARY
[facility] AND [facility]  Discharge Summary    Eugenie Dalton Patient Status:  Inpatient    1935 MRN [id number]   Location [facility] 4W/SW/SE Attending No att. providers found   [facility] Day # 1 PCP Destinee Joy     Date of Admission: 2017 Afib, CKD, DNR and many other med problems who presented to ER with weakness, N/V, and anorexia. Pt admitted with MOSF. Did not improved and admitted to hospice. Pt  shortly after admission.         Other Discharge Instructions: ALCIDES Canales

## 2017-06-21 NOTE — TELEPHONE ENCOUNTER
Jelena Chambers, from [facility] called regarding Patients \"Death Certificate\" needing to be signed by Dr. Dustin Lomeli.  Home indicates the death certificate was faxed to: [phone]. Please call Jelena Chambers at: [phone] with any questions, thank you.

## 2017-06-21 NOTE — TELEPHONE ENCOUNTER
Informed Brett Byrnes at [address] [place] [address] was just faxed, she voiced understanding.

## 2017-06-21 NOTE — H&P
Pulmonary/Critical Care Admission Note    HPI:   Johanna Suarez is a 81year old male with No chief complaint on file.     Umm Sha    Pt is a 82 yo with recent dx of MM, malignant ascites, CHF, COPD, CAD, Afib, CKD, DNR and many other med problems w albuterol sulfate (VENTOLIN) (2.5 MG/3ML) 0.083% nebulizer solution 2.5 mg 2.5 mg Nebulization TID   [DISCONTINUED] 0.9%  NaCl infusion 500 mL Intravenous Continuous   [DISCONTINUED] Acetaminophen-Codeine #3 (TYLENOL #3) 300-30 MG tab 1 tablet 1 tablet Nabil Sow [COMPLETED] Pamidronate Disodium (AREDIA) 90 mg in sodium chloride 0.9 % 250 mL infusion 90 mg Intravenous Once   [COMPLETED] bumetanide (BUMEX) injection 3 mg 3 mg Intravenous Once   [DISCONTINUED] Sodium Polystyrene Sulfonate (KAYEXALATE) 15 GM/60ML blount 1.4 % ophthalmic solution 2 drop 2 drop Both Eyes Q1H PRN   [DISCONTINUED] acetaminophen (TYLENOL) 160 MG/5ML oral liquid 650 mg 650 mg Oral Q4H PRN   [DISCONTINUED] acetaminophen (TYLENOL) 650 MG rectal suppository 650 mg 650 mg Rectal Q4H PRN   [COMPLETE

## 2017-06-22 NOTE — DISCHARGE SUMMARY
Serene  Discharge Summary    Johanna Suarez Patient Status:  Inpatient    1935 MRN [id number]   Location [facility] 4W/SW/SE Attending No att. providers found   [facility] Day # 1 PCP Umm Dalton     Date of Admission: 2017 Afib, CKD, DNR and many other med problems who presented to ER with weakness, N/V, and anorexia. Pt admitted with MOSF. Did not improved and admitted to hospice. Pt  shortly after admission.         Other Discharge Instructions: ALCIDES Garcia

## 2017-06-30 ENCOUNTER — PRIOR ORIGINAL RECORDS (OUTPATIENT)
Dept: OTHER | Age: 82
End: 2017-06-30

## 2017-08-16 ENCOUNTER — TELEPHONE (OUTPATIENT)
Dept: GASTROENTEROLOGY | Facility: CLINIC | Age: 82
End: 2017-08-16

## 2017-08-18 NOTE — TELEPHONE ENCOUNTER
I called spouse tonight and we discussed Easton's death.   He sounds like he was developing either ileus or obstructive symptoms, then came in after an episode of massive vomiting found to have hypercalcemia delirium acute on chronic renal failure, likely a

## 2019-03-01 VITALS
HEIGHT: 66 IN | HEART RATE: 62 BPM | WEIGHT: 133 LBS | RESPIRATION RATE: 16 BRPM | BODY MASS INDEX: 21.38 KG/M2 | DIASTOLIC BLOOD PRESSURE: 45 MMHG | SYSTOLIC BLOOD PRESSURE: 80 MMHG

## 2019-03-01 VITALS
WEIGHT: 158 LBS | BODY MASS INDEX: 25.39 KG/M2 | HEART RATE: 64 BPM | SYSTOLIC BLOOD PRESSURE: 92 MMHG | RESPIRATION RATE: 16 BRPM | HEIGHT: 66 IN | DIASTOLIC BLOOD PRESSURE: 59 MMHG

## 2022-04-25 NOTE — PROGRESS NOTES
[facility]    Cardiology Progress Note    Gus Saavedra Patient Status:  Inpatient    1935 MRN [id number]   Location [facility] 3W/SW Attending Pete Mcgrath, [facility] Day # 4 PCP AYANA AHMADI     2017  Subjective: GLU  88  106*  98  90       No results for input(s): ALT, AST, ALB, AMYLASE, LIPASE, LDH in the last 72 hours. Invalid input(s): ALPHOS, TBIL, DBIL, TPROT    No results for input(s): TROP in the last 72 hours.     Allergies:     Adhesive Tape (Carolyne* rate.  - plan for RHC once Cr starts to rise  2. Recurrent Anasarca with need for repeat paracentesis  - Tx as above    3. Anemia    - s/p 2 units prbcs GI seeing the patient. 4. Mod-Severe TR / RV dysfunction     Will follow.   Rachel Marina MD  [facility].1

## (undated) DEVICE — ENDOSCOPY PACK UPPER: Brand: MEDLINE INDUSTRIES, INC.

## (undated) DEVICE — Device: Brand: DEFENDO AIR/WATER/SUCTION AND BIOPSY VALVE

## (undated) NOTE — LETTER
[address] [place] [address]  Authorization for Invasive Procedures  1.  I hereby authorize _____________________, my physician and whomever may be designated as the doctor's assistant, to perform the following operation and/or procedur performed for the purposes of advancing medicine, science, and/or education, provided my identity is not revealed. If the procedure has been videotaped, the physician/surgeon will obtain the original videotape.  The hospital will not be responsible for stor My signature below affirms that prior to the time of the procedure, I have explained to the patient and/or his legal representative, the risks and benefits involved in the proposed treatment and any reasonable alternative to the proposed treatment.  I have

## (undated) NOTE — IP AVS SNAPSHOT
[address]  [address] [facility] [phone]                Discharge Summary   3/22/2017    Evelyn oLmas           Admission Information        Provider Department    3/22/2017 Fior Mauro Take 325 mg by mouth daily. Budesonide-Formoterol Fumarate 160-4.5 MCG/ACT Aero   Commonly known as:  SYMBICORT   Next dose due:  3/27/17 evening          Inhale 2 puffs into the lungs 2 (two) times daily. Why:  PT WILL MAKE OWN F/U DIABETES IN 2 WEEKS WITH DR. DEGROOT    Contact information:    [address] [address] [phone]          Follow up with Kyle Morocho MD In 2 weeks.     Specialties:  CARDIOLOGY, Internal Medicin 1.1 (03/25/17)  1.3 (H) (03/25/17)  0.2 (03/25/17)  0.1      Metabolic Lab Results  (Last result in the past 90 days)    HgbA1C Glucose BUN Creatinine Calcium Alkaline Phosph AST    (03/22/17)  5.3 (03/27/17)  102 (H) (03/27/17)  22 (H) (03/27/17)  1.33 (0 Enter your [employer] Activation Code exactly as it appears below along with your Zip Code and Date of Birth to complete the sign-up process. If you do not sign up before the expiration date, you must request a new code.     Your unique [employer] Access Code: SK Use:  “Thinning” of blood to prevent clotting within blood vessels, Pain relief   Most common side effects:  Bleeding, stomach upset   What to report to your healthcare team: Unusual bleeding, abdominal pain, dark, loose bowel movements           Non-Narco

## (undated) NOTE — MR AVS SNAPSHOT
Yoav Balbuena [address] [place] [address] 185 1604 [phone]               Thank you for choosing us for your health care visit with BALDOMERO Diamond.   We are glad to serve you and happy to pr Take 200 mg by mouth nightly. Commonly known as:  PACERONE           aspirin  MG Tbec   Take 325 mg by mouth daily. Budesonide-Formoterol Fumarate 160-4.5 MCG/ACT Aero   Inhale 2 puffs into the lungs 2 (two) times daily.    Commonly known not sign up before the expiration date, you must request a new code. Your unique [employer] Access Code: [email or web address]  Expires: 4/2/2017  4:20 PM    If you have questions, you can call [phone] to talk to our [facility] Staff.  Remember, Abad i

## (undated) NOTE — MR AVS SNAPSHOT
Yoav Balbuena [address] [place] [address] 185 1604 [phone]               Thank you for choosing us for your health care visit with BALDOMERO Cloud.   We are glad to serve you and happy to pr Take 1 tablet (0.25 mg total) by mouth nightly as needed for Anxiety. Commonly known as:  XANAX           Amiodarone HCl 100 MG Tabs   Take 1 tablet (100 mg total) by mouth nightly.    Commonly known as:  PACERONE           aspirin 81 MG Tabs   Take 1 tab saccharomyces boulardii 250 MG Pack   Take 1 mg by mouth 2 (two) times daily. Commonly known as:  FLORASTOR KIDS           spironolactone 25 MG Tabs   Take 0.5 tablets (12.5 mg total) by mouth daily.    Commonly known as:  ALDACTONE                   Res Enjoy your food, but eat less. Fully enjoy your food when eating. Don’t eat while distracted and slow down. Avoid over sized portions. Don’t eat while when you’re bored.      EAT THESE FOODS MORE OFTEN: EAT THESE FOODS LESS OFTEN:   Make half your pl

## (undated) NOTE — LETTER
[facility]  Administration of Anesthesia  1.    Fatoumata Smart, or _________________________________ acting on his/her behalf, (Patient) (Dependent/Representative) request to receive anesthesia for my pending procedure/operation/treatm pressure, difficulty urinating, slowing of the baby's heart rate and headache. Rare risks include infections, high spinal         block, spinal bleeding, seizure, cardiac arrest and death.   7.   AWARENESS: I understand that it is possible (but unlike ________________________________________________  (Date) (Time)                                                                                                  (Responsible person in case of minor/ unconscious pt) /Relationship    My signature below affir

## (undated) NOTE — MR AVS SNAPSHOT
Yoav Balbuena [address] [place] [address] 185 1604 [phone]               Thank you for choosing us for your health care visit with BALDOMERO Worley.   We are glad to serve you and happy to pr Inhale 2 puffs into the lungs 2 (two) times daily. Commonly known as:  SYMBICORT           carvedilol 3.125 MG Tabs   Take 1 tablet (3.125 mg total) by mouth 2 (two) times daily with meals.    Commonly known as:  COREG           Levothyroxine Sodium 112 M Aerobic physical activity Regular aerobic physical activity (e.g., brisk walking, light jogging, cycling, swimming, etc.) for a goal of at least 150 minutes per week. Moderation of alcohol consumption Men: limit to <= 2 drinks* per day.   Women and lighte

## (undated) NOTE — MR AVS SNAPSHOT
Yoav Balbuena [address] [place] [address]  Latrice [facility] [phone] [phone]               Thank you for choosing us for your health care visit with Taylor Ellis NP.   We are glad to serve you and happy to provide yo · Compare your home medications with the medication list attached, call with questions or there are any differences    · Heart healthy, decreased refined sugars, and  2000 mg sodium restricted diet and maintain fluids at 32 to 64 ounces per day.  Common hig Take 2 tablets (2 mg total) by mouth 2 (two) times daily. What changed:    - how much to take  - when to take this   Commonly known as:  BUMEX           Clopidogrel Bisulfate 75 MG Tabs   Take 75 mg by mouth daily.    Commonly known as:  PLAVIX Potassium 4.4 3.3-5.1 mmol/L    Chloride 94  mmol/L    CO2 28 22-32 mmol/L    BUN 41 8-20 mg/dL    Creatinine 2.44 0.50-1.50 mg/dL    Calcium, Total 10.8 8.5-10.5 mg/dL    BUN/CREA Ratio 16.8 10.0-20.0    Anion Gap 11 0-18 mmol/L    Calculated Osmol Enter your [employer] Activation Code exactly as it appears below along with your Zip Code and Date of Birth to complete the sign-up process. If you do not sign up before the expiration date, you must request a new code.     Your unique [employer] Access Code: [email or web address]

## (undated) NOTE — MR AVS SNAPSHOT
Yoav Balbuena [address] [place] [address] 185 1604 [phone]               Thank you for choosing us for your health care visit with Jeppie Nageotte, APN.   We are glad to serve you and happy to pr Budesonide-Formoterol Fumarate 160-4.5 MCG/ACT Aero   Inhale 2 puffs into the lungs 2 (two) times daily. Commonly known as:  SYMBICORT           bumetanide 2 MG Tabs   Take 1 tablet (2 mg total) by mouth 3 (three) times daily.    What changed:    - when Potassium 4.3 3.3-5.1 mmol/L    Chloride 84  mmol/L    CO2 31 22-32 mmol/L    BUN 82 8-20 mg/dL    Creatinine 4.37 0.50-1.50 mg/dL    Calcium, Total 9.0 8.5-10.5 mg/dL    BUN/CREA Ratio 18.8 10.0-20.0    Anion Gap 14 0-18    Calculated Osmolality 29 not sign up before the expiration date, you must request a new code. Your unique [employer] Access Code: [email or web address]  Expires: 6/5/2017  4:10 PM    If you have questions, you can call [phone] to talk to our [facility] Staff.  Remember, Abad zhu

## (undated) NOTE — MR AVS SNAPSHOT
Yoav Balbuena [address] [place] [address] 185 1604 [phone]               Thank you for choosing us for your health care visit with Jeppie Nageotte, APN.   We are glad to serve you and happy to pr Inhale 2 puffs into the lungs 2 (two) times daily. Commonly known as:  SYMBICORT           bumetanide 2 MG Tabs   Take 1 tablet (2 mg total) by mouth daily.    What changed:    - when to take this  - additional instructions   Commonly known as:  Art Wang visit,  view other health information, and more. To sign up or find more information, go to [email or web address] and click on the Sign Up Now link in the [employer] box.      Enter your [employer] Activation Code exactly as it appears below along with yo

## (undated) NOTE — MR AVS SNAPSHOT
Yoav Balbuena [address] [place] [address] [phone]  [phone]               Thank you for choosing us for your health care visit with BALDOMERO Wesley.   We are glad to serve you and happy to pr Take 200 mg by mouth nightly. Commonly known as:  PACERONE           aspirin  MG Tbec   Take 325 mg by mouth daily. Budesonide-Formoterol Fumarate 160-4.5 MCG/ACT Aero   Inhale 2 puffs into the lungs 2 (two) times daily.    Commonly known Enter your [employer] Activation Code exactly as it appears below along with your Zip Code and Date of Birth to complete the sign-up process. If you do not sign up before the expiration date, you must request a new code.     Your unique [employer] Access Code: [email or web address]

## (undated) NOTE — MR AVS SNAPSHOT
Yoav Balbuena [address] [place] [address] [phone]  [phone]               Thank you for choosing us for your health care visit with BALDOMERO Morales.   We are glad to serve you and happy to pr Budesonide-Formoterol Fumarate 160-4.5 MCG/ACT Aero   Inhale 2 puffs into the lungs 2 (two) times daily. Commonly known as:  SYMBICORT           bumetanide 2 MG Tabs   Take 1 tablet (2 mg total) by mouth See Admin Instructions.  2 mg in the morning and 1 The reference range was determined from the following sources:  Yong et al. CHF 2004: 10                    Medications Administered in the Office Today     bumetanide (BUMEX) 0.25 MG/ML injection                    MyChart     Sign up for Abad, you

## (undated) NOTE — LETTER
[facility]  Administration of Anesthesia  1.    Emely Britt, or _________________________________ acting on his/her behalf, (Patient) (Dependent/Representative) request to receive anesthesia for my pending procedure/operation/treatm pressure, difficulty urinating, slowing of the baby's heart rate and headache. Rare risks include infections, high spinal         block, spinal bleeding, seizure, cardiac arrest and death.   7.   AWARENESS: I understand that it is possible (but unlike ________________________________________________  (Date) (Time)                                                                                                  (Responsible person in case of minor/ unconscious pt) /Relationship    My signature below affir

## (undated) NOTE — IP AVS SNAPSHOT
[address] [facility] [phone]                Discharge Summary   4/24/2017    Bekah Hamilton           Admission Information        Provider Department    4/24/2017 Kitty Kessler MD [facility] Take 1 tablet (10 mg total) by mouth every 8 (eight) hours.     Tonyre                                  isosorbide dinitrate 5 MG Tabs   Last time this was given:  5 mg on 5/4/2017  9:21 AM   Commonly known as:  ISORDIL TITRADOSE   Next dose due:  5 Instructions Authorizing Provider    Morning Afternoon Evening As Needed    aspirin 81 MG Tabs   Next dose due:  05/05/2017 0900        Take 1 tablet (81 mg total) by mouth daily.     Kamala Raman                           Budesonide-Formoterol saccharomyces boulardii 250 MG Pack   Commonly known as:  FLORASTOR KIDS   Next dose due:  05/04/2017 evening        Take 1 mg by mouth 2 (two) times daily.                                  STOP taking these medications     brittany Penni [place] [id number]   [phone]              Immunization History as of 5/4/2017  Reviewed on 4/25/2017    No immunizations on file.       Recent Hematology Lab Results  (Last 3 results in the past 90 days)    WBC RBC Hemoglobin Hematocrit MCV MCH MCHC RDW Radiology Exams     None      Patient Belongings       Most Recent Value    All belongings returned to patient at discharge Pt's bedside belongings    Medications Sent Home None to return    Medications Returned:           Additional Information       We ar Medication Side Effects - Medications to be taken at home  As your caregivers, we want you to be aware of the medications you are prescribed to take and their potential SIDE EFFECTS.  Your nurse will review your medications with you before you are discharge What to report to your healthcare team: Unusual bleeding, abdominal pain, dark, loose bowel movements           Blood Producing Medications     epoetin tray 62529 UNIT/ML Injection Solution    folic acid 1 MG Oral Tab    cyanocobalamin 500 MCG Oral Tab What to report to your healthcare provider: Head or mouth pain, coating on mouth/tongue, shortness of breath, sensation of heart racing, rash, or itching           Endocrine Medications     Levothyroxine Sodium 112 MCG Oral Tab         Use: Regulate metabo

## (undated) NOTE — LETTER
[address]  INFORMED CONSENT FOR TRANSFUSION OF BLOOD OR BLOOD PRODUCTS   My physician has informed me of the nature, purpose, benefits and risks of transfusion for blood and blood components that he/she may deem nece (Signature of Patient)                                       (Responsible party in case of Minor,                                                                            Incompetent, or unconscious Patient)  __________________________________    _______